# Patient Record
Sex: MALE
[De-identification: names, ages, dates, MRNs, and addresses within clinical notes are randomized per-mention and may not be internally consistent; named-entity substitution may affect disease eponyms.]

---

## 2017-02-18 VITALS — HEART RATE: 85 BPM

## 2018-04-20 ENCOUNTER — HOSPITAL ENCOUNTER (OUTPATIENT)
Dept: HOSPITAL 42 - OPSURG | Age: 58
Discharge: HOME | End: 2018-04-20
Attending: RADIOLOGY
Payer: MEDICARE

## 2018-04-20 VITALS — OXYGEN SATURATION: 96 % | TEMPERATURE: 97.7 F

## 2018-04-20 VITALS — BODY MASS INDEX: 56.7 KG/M2

## 2018-04-20 VITALS — SYSTOLIC BLOOD PRESSURE: 149 MMHG | DIASTOLIC BLOOD PRESSURE: 72 MMHG | HEART RATE: 72 BPM

## 2018-04-20 VITALS — RESPIRATION RATE: 18 BRPM

## 2018-04-20 DIAGNOSIS — K70.31: Primary | ICD-10-CM

## 2018-04-20 LAB
APPEARANCE FLD: (no result)
APTT BLD: 35 SECONDS (ref 25.1–36.5)
BASOPHILS # BLD AUTO: 0.09 K/MM3 (ref 0–2)
BASOPHILS NFR BLD: 1.1 % (ref 0–3)
BODY FLD TYPE: (no result)
BUN SERPL-MCNC: 14 MG/DL (ref 7–21)
CALCIUM SERPL-MCNC: 8.9 MG/DL (ref 8.4–10.5)
CELL CNT PNL FLD: 100 (ref 0–0)
EOSINOPHIL # BLD: 0.5 10*3/UL (ref 0–0.7)
EOSINOPHIL NFR BLD: 6 % (ref 1.5–5)
ERYTHROCYTE [DISTWIDTH] IN BLOOD BY AUTOMATED COUNT: 13.9 % (ref 11.5–14.5)
GFR NON-AFRICAN AMERICAN: > 60
GRANULOCYTES # BLD: 4.55 10*3/UL (ref 1.4–6.5)
GRANULOCYTES NFR BLD: 55.7 % (ref 50–68)
HGB BLD-MCNC: 13.3 G/DL (ref 14–18)
INR PPP: 1.92 (ref 0.93–1.08)
LYMPHOCYTES # BLD: 2.3 10*3/UL (ref 1.2–3.4)
LYMPHOCYTES NFR BLD AUTO: 28.2 % (ref 22–35)
MCH RBC QN AUTO: 31.7 PG (ref 25–35)
MCHC RBC AUTO-ENTMCNC: 32.6 G/DL (ref 31–37)
MCV RBC AUTO: 97.4 FL (ref 80–105)
MONOCYTES # BLD AUTO: 0.7 10*3/UL (ref 0.1–0.6)
MONOCYTES NFR BLD: 9 % (ref 1–6)
PLATELET # BLD: 154 10^3/UL (ref 120–450)
PMV BLD AUTO: 9.9 FL (ref 7–11)
PROTHROMBIN TIME: 22.4 SECONDS (ref 9.4–12.5)
RBC # BLD AUTO: 4.19 10^6/UL (ref 3.5–6.1)
WBC # BLD AUTO: 8.2 10^3/UL (ref 4.5–11)

## 2018-04-20 PROCEDURE — 49083 ABD PARACENTESIS W/IMAGING: CPT

## 2018-04-20 PROCEDURE — 88108 CYTOPATH CONCENTRATE TECH: CPT

## 2018-04-20 PROCEDURE — 89051 BODY FLUID CELL COUNT: CPT

## 2018-04-20 PROCEDURE — 36415 COLL VENOUS BLD VENIPUNCTURE: CPT

## 2018-04-20 PROCEDURE — 87070 CULTURE OTHR SPECIMN AEROBIC: CPT

## 2018-04-20 PROCEDURE — 87075 CULTR BACTERIA EXCEPT BLOOD: CPT

## 2018-04-20 PROCEDURE — 80048 BASIC METABOLIC PNL TOTAL CA: CPT

## 2018-04-20 PROCEDURE — 87101 SKIN FUNGI CULTURE: CPT

## 2018-04-20 PROCEDURE — 85610 PROTHROMBIN TIME: CPT

## 2018-04-20 PROCEDURE — 82042 OTHER SOURCE ALBUMIN QUAN EA: CPT

## 2018-04-20 PROCEDURE — 85025 COMPLETE CBC W/AUTO DIFF WBC: CPT

## 2018-04-20 PROCEDURE — 85730 THROMBOPLASTIN TIME PARTIAL: CPT

## 2018-04-20 NOTE — US
PROCEDURE:  Ultrasound guided paracentesis.



HISTORY:

Alcoholic cirrhosis.  Ascites with abdominal pain and distension.



PHYSICIAN(S):  Boston Paniagua MD.



TECHNIQUE:

The relative risks and indications for the procedure were explained 

to the patient and informed written consent obtained. Sonography of 

the abdomen was performed in a supine position. This revealed a small 

to moderate amount of non-loculated ascites, greatest in the right 

mid abdomen. 



A puncture site was selected and the area was prepped and draped in 

the usual sterile fashion. 1% Xylocaine was used to anesthetize the 

skin and soft tissues. A 7 Indonesian paracentesis catheter was trocared 

into the right mid abdomenand 2400 cc of bloody, non clotting blood 

aspirated. The appropriate labs were sent. The patient was advised 

about close clinical follow-up along with his wife. Dr. Luke santiago was 

contact 



IMPRESSION:

Ultrasound-guided paracentesis in the right mid abdomen. 2400 cc of 

bloody fluid were aspirated

## 2018-05-15 ENCOUNTER — HOSPITAL ENCOUNTER (INPATIENT)
Dept: HOSPITAL 42 - ED | Age: 58
LOS: 2 days | Discharge: HOME | DRG: 292 | End: 2018-05-17
Attending: INTERNAL MEDICINE | Admitting: INTERNAL MEDICINE
Payer: MEDICARE

## 2018-05-15 VITALS — BODY MASS INDEX: 51.6 KG/M2

## 2018-05-15 DIAGNOSIS — N39.0: ICD-10-CM

## 2018-05-15 DIAGNOSIS — B96.20: ICD-10-CM

## 2018-05-15 DIAGNOSIS — M06.9: ICD-10-CM

## 2018-05-15 DIAGNOSIS — R18.8: ICD-10-CM

## 2018-05-15 DIAGNOSIS — Z83.3: ICD-10-CM

## 2018-05-15 DIAGNOSIS — I07.1: ICD-10-CM

## 2018-05-15 DIAGNOSIS — Z98.84: ICD-10-CM

## 2018-05-15 DIAGNOSIS — I48.2: ICD-10-CM

## 2018-05-15 DIAGNOSIS — R79.1: ICD-10-CM

## 2018-05-15 DIAGNOSIS — G47.33: ICD-10-CM

## 2018-05-15 DIAGNOSIS — I50.82: ICD-10-CM

## 2018-05-15 DIAGNOSIS — K21.9: ICD-10-CM

## 2018-05-15 DIAGNOSIS — I50.32: ICD-10-CM

## 2018-05-15 DIAGNOSIS — I11.0: Primary | ICD-10-CM

## 2018-05-15 DIAGNOSIS — Z82.3: ICD-10-CM

## 2018-05-15 DIAGNOSIS — Z87.891: ICD-10-CM

## 2018-05-15 DIAGNOSIS — Z79.01: ICD-10-CM

## 2018-05-15 DIAGNOSIS — K70.9: ICD-10-CM

## 2018-05-15 DIAGNOSIS — I89.0: ICD-10-CM

## 2018-05-15 DIAGNOSIS — E66.2: ICD-10-CM

## 2018-05-15 DIAGNOSIS — I27.29: ICD-10-CM

## 2018-05-15 DIAGNOSIS — K74.60: ICD-10-CM

## 2018-05-15 DIAGNOSIS — Z82.49: ICD-10-CM

## 2018-05-15 LAB
ALBUMIN SERPL-MCNC: 4.2 G/DL (ref 3–4.8)
ALBUMIN/GLOB SERPL: 1.4 {RATIO} (ref 1.1–1.8)
ALT SERPL-CCNC: 28 U/L (ref 7–56)
APPEARANCE UR: CLEAR
APTT BLD: 38.4 SECONDS (ref 25.1–36.5)
AST SERPL-CCNC: 30 U/L (ref 17–59)
BACTERIA #/AREA URNS HPF: (no result) /[HPF]
BASOPHILS # BLD AUTO: 0.1 K/MM3 (ref 0–2)
BASOPHILS NFR BLD: 1.5 % (ref 0–3)
BILIRUB UR-MCNC: NEGATIVE MG/DL
BNP SERPL-MCNC: 1110 PG/ML (ref 0–450)
BUN SERPL-MCNC: 17 MG/DL (ref 7–21)
CALCIUM SERPL-MCNC: 9.2 MG/DL (ref 8.4–10.5)
COLOR UR: YELLOW
EOSINOPHIL # BLD: 0.4 10*3/UL (ref 0–0.7)
EOSINOPHIL NFR BLD: 5.5 % (ref 1.5–5)
EPI CELLS #/AREA URNS HPF: (no result) /HPF (ref 0–5)
ERYTHROCYTE [DISTWIDTH] IN BLOOD BY AUTOMATED COUNT: 13.8 % (ref 11.5–14.5)
GFR NON-AFRICAN AMERICAN: > 60
GLUCOSE UR STRIP-MCNC: NEGATIVE MG/DL
GRANULOCYTES # BLD: 3.73 10*3/UL (ref 1.4–6.5)
GRANULOCYTES NFR BLD: 57.2 % (ref 50–68)
HGB BLD-MCNC: 13 G/DL (ref 14–18)
INR PPP: 2.64 (ref 0.93–1.08)
LEUKOCYTE ESTERASE UR-ACNC: (no result) LEU/UL
LIPASE SERPL-CCNC: 162 U/L (ref 23–300)
LYMPHOCYTES # BLD: 1.5 10*3/UL (ref 1.2–3.4)
LYMPHOCYTES NFR BLD AUTO: 22.8 % (ref 22–35)
MCH RBC QN AUTO: 31.6 PG (ref 25–35)
MCHC RBC AUTO-ENTMCNC: 32.7 G/DL (ref 31–37)
MCV RBC AUTO: 96.4 FL (ref 80–105)
MONOCYTES # BLD AUTO: 0.9 10*3/UL (ref 0.1–0.6)
MONOCYTES NFR BLD: 13 % (ref 1–6)
PH UR STRIP: 6 [PH] (ref 4.7–8)
PLATELET # BLD: 181 10^3/UL (ref 120–450)
PMV BLD AUTO: 9.8 FL (ref 7–11)
PROT UR STRIP-MCNC: NEGATIVE MG/DL
PROTHROMBIN TIME: 30.7 SECONDS (ref 9.4–12.5)
RBC # BLD AUTO: 4.12 10^6/UL (ref 3.5–6.1)
RBC # UR STRIP: (no result) /UL
SP GR UR STRIP: 1.01 (ref 1–1.03)
TROPONIN I SERPL-MCNC: < 0.01 NG/ML
UROBILINOGEN UR STRIP-ACNC: 1 E.U./DL
WBC # BLD AUTO: 6.5 10^3/UL (ref 4.5–11)

## 2018-05-15 NOTE — CARD
--------------- APPROVED REPORT --------------





EKG Measurement

Heart [id number]

[id number]

[id number]

[id number]



<Conclusion>

Atrial fibrillation

Abnormal ECG

## 2018-05-15 NOTE — RAD
HISTORY:

58yoM, with sob  



COMPARISON:

02/18/2017 



FINDINGS:



LUNGS:

No active pulmonary disease.



PLEURA:

No significant pleural effusion identified, no pneumothorax apparent.



CARDIOVASCULAR:

Mild cardiomegaly



OSSEOUS STRUCTURES:

No significant abnormalities.



VISUALIZED UPPER ABDOMEN:

Normal.



OTHER FINDINGS:

None.



IMPRESSION:

No active disease.

## 2018-05-15 NOTE — ED PDOC
Arrival/HPI





- General


Chief Complaint: Shortness Of Breath


Time Seen by Provider: 05/15/18 14:14


Historian: Patient, Spouse





- History of Present Illness


Narrative History of Present Illness (Text): 


you were treated in the ED today for hx of CHF, Afib on coumadin, ascites/lymph 

edema with prior abdomen fluid taps for large volume of fluid, with difficulty 

breathing and abdomen fullness but otherwise without any nausea/vomiting/

headache/dizziness/chest pain/numbness/tingling/loss of limb function/pain with 

urination. 





05/15/18 17:12





Time/Duration: 1 week


Symptom Onset: Gradual


Symptom Course: Unchanged


Quality: Aching


Severity Level: 2


Activities at Onset: Rest


Context: Sitting





Past Medical History





- Provider Review


Nursing Documentation Reviewed: Yes





- Travel History


Have you recently traveled outside [place] w/in the past 3 mons?: No





- Infectious Disease


Hx of Infectious Diseases: None





- Tetanus Immunization


Tetanus Immunization: Unknown





- Cardiac


Hx Atrial Fibrillation: Yes


Hx Hypertension: Yes





- Pulmonary


Hx Respiratory Disorders: No





- Neurological


Hx Paralysis: No





- HEENT


Hx HEENT Disorder:  (WEARS RX GLASSES)





- Renal


Hx Renal Disorder: No





- Endocrine/Metabolic


Hx Endocrine Disorders: No





- Hematological/Oncological


Hx Blood Transfusions: No


Other/Comment: lymphedema





- Integumentary


Hx Dermatological Disorder: Yes (BILATERAL LE LYMPEDEMA + 4 EDEMA)





- Musculoskeletal/Rheumatological


Hx Arthritis: Yes


Hx Rheumatoid Arthritis: Yes





- Gastrointestinal


Hx Gastrointestinal Disorders: Yes (reflux/hemorrhoids/ulcer)


Other/Comment: ascites





- Genitourinary/Gynecological


Hx Reproductive Disorders: No





- Psychiatric


Hx Psychophysiologic Disorder: No


Hx Emotional Abuse: No


Hx Physical Abuse: No


Hx Substance Use: Yes





- Surgical History


Hx Gastric Bypass Surgery: Yes (2000)


Other/Comment: Cardiac ablation for atrial fibrillation





- Anesthesia


Hx Anesthesia Reactions: No


Hx Malignant Hyperthermia: No





- Suicidal Assessment


Feels Threatened In Home Enviroment: No





Family/Social History





- Physician Review


Nursing Documentation Reviewed: Yes


Family/Social History: No Known Family HX


Smoking Status: Never Smoked


Hx Alcohol Use: Yes (DAILY WINE.BOTTLE OF WINE DAILY. LAST DRANK 6-28-16)


Hx Substance Use: Yes


Hx Substance Use Treatment: No





Allergies/Home Meds


Allergies/Adverse Reactions: 


Allergies





No Known Allergies Allergy (Verified 05/15/18 14:04)


 








Home Medications: 


 Home Meds











 Medication  Instructions  Recorded  Confirmed


 


Metoprolol Tartrate 150 mg PO DAILY 04/20/18 05/15/18


 


traMADol [Ultram] 50 mg PO Q6H PRN 04/20/18 05/15/18














Review of Systems





- Review of Systems


Constitutional: Normal


Eyes: Normal


ENT: Normal


Respiratory: SOB


Cardiovascular: Normal


Gastrointestinal: Abdominal Pain


Genitourinary Male: Normal


Musculoskeletal: Normal


Skin: Normal


Neurological: Normal


Endocrine: Normal


Hemo/Lymphatic: Normal


Psychiatric: Normal





Physical Exam


Vital Signs Reviewed: Yes


Vital Signs











  Temp Pulse Resp BP Pulse Ox


 


 05/15/18 14:46    18   95


 


 05/15/18 14:05  97.6 F  69  18  134/81  99











Temperature: Afebrile


Blood Pressure: Hypertensive


Pulse: Regular


Respiratory Rate: Normal


Appearance: Positive for: Well-Appearing, Non-Toxic, Comfortable


Pain Distress: None


Mental Status: Positive for: Alert and Oriented X 3





- Systems Exam


Head: Present: Atraumatic, Normocephalic


Pupils: Present: PERRL


Extroacular Muscles: Present: EOMI


Conjunctiva: Present: Normal


Ears: Present: Normal


Mouth: Present: Moist Mucous Membranes


Pharnyx: Present: Normal


Nose (External): Present: Atraumatic


Nose (Internal): Present: Normal Inspection


Neck: Present: Normal Range of Motion


Respiratory/Chest: Present: Clear to Auscultation, Good Air Exchange


Cardiovascular: Present: Regular Rate and Rhythm


Abdomen: Present: Other (obesity).  No: Tenderness, Distention, Normal Bowel 

Sounds, Peritoneal Signs, Rebound, Guarding, McBurney's Point Tender, Rovsing's 

Sign Present, Hernias, Feeding Tubes, Ostomy Tubes, Mass/Organomegaly, Scars


Back: Present: Normal Inspection


Upper Extremity: Present: Normal Inspection


Lower Extremity: Present: Edema


Neurological: Present: GCS=15, CN II-XII Intact, Speech Normal, Motor Func 

Grossly Intact


Skin: Present: Warm, Normal Color


Psychiatric: Present: Alert, Oriented x 3, Normal Insight, Normal Concentration





Medical Decision Making


ED Course and Treatment: 


you were treated in the ED today for hx of CHF, Afib on coumadin, ascites/lymph 

edema with prior abdomen fluid taps for large volume of fluid, with difficulty 

breathing and abdomen fullness but otherwise without any nausea/vomiting/

headache/dizziness/chest pain/numbness/tingling/loss of limb function/pain with 

urination. You were otherwise breathing easily, pink moist lips, smiling and 

talking with your wife, good strength/sensation, alert/oriented, walking easily

, clear lungs, no abdomen tenderness, no fever temp 97.6, stable heart rate 69, 

stable breathing rate 18, excellent oxygen level 99% room air, elevated blood 

pressure 134/81 which we recommend repeat in 2-3 days primary care office to 

determine further treatment, you have blood tests no infection count 6, stable 

blood level hemoglobin 13/platelets 181, stable chemistry, Liver bilirubin 

mildly elevated 2.5, heart blood test negative less than 0.01, bnp 1110, urine 

test unclear sign of infection with trace leukocytes, INR 2.64, lipase normal 

162, radiology chest xray with no active disease, ECG afib, observation done in 

the ED without improvement.





ct a/p 5/3/18 with moderate ascites and anasarca with extensive subcutaneous 

edema.





Report Date : 05/15/2018 15:16:38


Procedure: Chest xray


Dictator : Aris Loomis MD


IMPRESSION: No active disease.





d/w hospitalist Dr. Winn who stated can admit to telemetry for fluid overload/

ascites evaluation for possible paracentesis.








05/15/18 17:15





Reassessment Condition: Re-examined, Unchanged





- Lab Interpretations


Lab Results: 








 05/15/18 14:30 





 05/15/18 14:30 





 Lab Results





05/15/18 14:30: Sodium 137, Potassium 5.0, Chloride 100, Carbon Dioxide 27, 

Anion Gap 16, BUN 17, Creatinine 0.7 L, Est GFR ( Amer) > 60, Est GFR (

Non-Af Amer) > 60, Random Glucose 105, Calcium 9.2, Magnesium 1.9, Total 

Bilirubin 2.5 H, AST 30, ALT 28, Alkaline Phosphatase 92, Lactate Dehydrogenase 

457, Total Creatine Kinase 80, Troponin I < 0.01, NT-Pro-B Natriuret Pep 1110 H

, Total Protein 7.2, Albumin 4.2, Globulin 3.1, Albumin/Globulin Ratio 1.4, 

Lipase 162


05/15/18 14:30: PT 30.7 H, INR 2.64 H, APTT 38.4 H


05/15/18 14:30: WBC 6.5  D, RBC 4.12, Hgb 13.0 L, Hct 39.7 L, MCV 96.4, MCH 31.6

, MCHC 32.7, RDW 13.8, Plt Count 181, MPV 9.8, Gran % 57.2, Lymph % (Auto) 22.8

, Mono % (Auto) 13.0 H, Eos % (Auto) 5.5 H, Baso % (Auto) 1.5, Gran # 3.73, 

Lymph # (Auto) 1.5, Mono # (Auto) 0.9 H, Eos # (Auto) 0.4, Baso # (Auto) 0.10


05/15/18 14:20: Urine Color Yellow, Urine Appearance Clear, Urine pH 6.0, Ur 

Specific Gravity 1.015, Urine Protein Negative, Urine Glucose (UA) Negative, 

Urine Ketones Negative, Urine Blood Trace-intact H, Urine Nitrate Negative, 

Urine Bilirubin Negative, Urine Urobilinogen 1.0 H, Ur Leukocyte Esterase Small 

H, Urine RBC 1 - 3, Urine WBC 2 - 5, Ur Epithelial Cells None, Urine Bacteria 

Few








I have reviewed the lab results: Yes





- RAD Interpretation


Radiology Orders: 








05/15/18 14:38


CHEST PORTABLE [RAD] Stat 











: Radiologist





- EKG Interpretation


Interpreted by ED Physician: Yes (afib)


Type: 12 lead EKG


Comparison: Similar to previous EKG (2/18/17)





- Medication Orders


Current Medication Orders: 











Discontinued Medications





Morphine Sulfate (Morphine)  4 mg IVP STAT STA


   Stop: 05/15/18 15:59


   Last Admin: 05/15/18 16:15  Dose: 4 mg





MAR Pain Assessment


 Document     05/15/18 16:15  MS  (Rec: 05/15/18 16:17  MS  [id number])


     Pain Reassessment


      Is this a pain reassessment?               No


     Sleep


      Is patient sleeping during reassessment?   No


     Presence of Pain


      Presence of Pain                           Yes


     Pain Scale Used


      Pain Scale Used                            Numeric


     Location


      Upper or Lower                             Upper


      Pain Location Body Site                    Abdomen


     Description


      Description                                Constant


      Intensity of Pain at present               9


      Pain Behavior                              Moaning


                                                 Guarding


                                                 Grasping Site


IVP Administration


 Document     05/15/18 16:15  MS  (Rec: 05/15/18 16:17  MS  [id number])


     Charges for Administration


      # of IVP Administrations                   1














Disposition/Present on Arrival





- Present on Arrival


Any Indicators Present on Arrival: No


History of DVT/PE: No


History of Uncontrolled Diabetes: No


Urinary Catheter: No


History of Decub. Ulcer: No


History Surgical Site Infection Following: None





- Disposition


Have Diagnosis and Disposition been Completed?: Yes


Diagnosis: 


 Congestive heart failure (CHF), Ascites





Disposition: HOSPITALIZED


Disposition Time: 17:16


Patient Plan: Admission, Telemetry


Condition: STABLE


Discharge Instructions (ExitCare):  Heart Failure (ED)


Referrals: 


Nawaf Hampton MD [Primary Care Provider] - Follow up with primary


Forms:  [email or web address] (English)

## 2018-05-15 NOTE — CP.PCM.HP
<Handy Hernandes - Last Filed: 05/15/18 18:18>





History of Present Illness





- History of Present Illness


History of Present Illness: 


58 year old male with past medical history of ascites, CHF, atrial fibrillation

, morbid obesity, lymphadema, GERD, Pulmonary HTN presents with shortness of 

breath. Patient states shortness of breath began yesterday. When he tries to 

ambulate a small distance he feels like he cant breath and he has also noted 

his belly has been getting juarez and larger. Patient states that he has gained 

about 50 lbs over the past 6 months and that his abdomen has been tapped twice, 

last in April for which 2.5 L was removed. He saw his cardiologist, Dr. Orr last week for which he was given a prescription for a water pill but 

he has yest to have it filled. Patient denies any chest pain, fever, cough, 

chills, sick contacts or any other complaints at this time. 





Cardio: Dr. Orr


PMD: Dr. Naik





PMH: CHF (distolic dysfunction), atrial fibrillation, morbid obesity, lymphadema

, GERD, Pulmonary HTN


Med: Coumadin 2.5 mg PO daily, Metoprolol 100 mg PO daily, Digoxin 0.25 PO daily

, Tramadol 50 mg PO TID PRN, folic acid 


Allergy: NKDA


PSH: IVC filter placement (2011), Cardiac Ablation (2000), Gastric bypass (2000)


Hosp: recent hospitalization in February for similar symptoms and paracentesis 

by Dr. Paniagua in April


FH: Mother - CVA, DM; Father - CVA, HTN


Social: bottle of wine over 3-4 days, smoked a pack a day, denies illicit drug 

use








Present on Admission





- Present on Admission


Any Indicators Present on Admission: No





Review of Systems





- Constitutional


Constitutional: absent: Chills, Fever, Night Sweats





- EENT


Eyes: absent: Blurred Vision, Change in Vision





- Cardiovascular


Cardiovascular: Dyspnea, Orthopnea.  absent: Chest Pain, Palpitations





- Respiratory


Respiratory: Dyspnea.  absent: Cough, Wheezing, Snoring





- Gastrointestinal


Gastrointestinal: Abdominal Pain.  absent: Coffee Ground Emesis, Melena, Nausea

, Vomiting





- Genitourinary


Genitourinary: absent: Change in Urinary Stream, Difficulty Urinating





- Musculoskeletal


Musculoskeletal: absent: Numbness, Tingling





- Neurological


Neurological: absent: Dizziness, Tingling





Past Patient History





- Infectious Disease


Hx of Infectious Diseases: None





- Tetanus Immunizations


Tetanus Immunization: Unknown





- Past Social History


Smoking Status: Never Smoked





- CARDIAC


Hx Atrial Fibrillation: Yes


Hx Hypertension: Yes





- PULMONARY


Hx Respiratory Disorders: No





- NEUROLOGICAL


Hx Paralysis: No





- HEENT


Hx HEENT Problems:  (WEARS RX GLASSES)





- RENAL


Hx Chronic Kidney Disease: No





- ENDOCRINE/METABOLIC


Hx Endocrine Disorders: No





- HEMATOLOGICAL/ONCOLOGICAL


Hx Blood Transfusions: No


Other/Comment: lymphedema





- INTEGUMENTARY


Hx Dermatological Problems: Yes (BILATERAL LE LYMPEDEMA + 4 EDEMA)





- MUSCULOSKELETAL/RHEUMATOLOGICAL


Hx Arthritis: Yes


Hx Rheumatoid Arthritis: Yes





- GASTROINTESTINAL


Hx Gastrointestinal Disorders: Yes (reflux/hemorrhoids/ulcer)


Other/Comment: ascites





- GENITOURINARY/GYNECOLOGICAL


Hx Reproductive Disorders: No





- PSYCHIATRIC


Hx Psychophysiologic Disorder: No


Hx Emotional Abuse: No


Hx Physical Abuse: No


Hx Substance Use: Yes





- SURGICAL HISTORY


Hx Gastric Bypass Surgery: Yes (2000)


Other/Comment: Cardiac ablation for atrial fibrillation





- ANESTHESIA


Hx Anesthesia Reactions: No


Hx Malignant Hyperthermia: No





Meds


Allergies/Adverse Reactions: 


 Allergies











Allergy/AdvReac Type Severity Reaction Status Date / Time


 


No Known Allergies Allergy   Verified 05/15/18 14:04














Physical Exam





- Constitutional


Appears: Non-toxic, No Acute Distress





- Head Exam


Head Exam: ATRAUMATIC, NORMAL INSPECTION, NORMOCEPHALIC





- Eye Exam


Eye Exam: EOMI, Normal appearance





- ENT Exam


ENT Exam: Mucous Membranes Moist





- Respiratory Exam


Respiratory Exam: Rales, NORMAL BREATHING PATTERN





- GI/Abdominal Exam


GI & Abdominal Exam: Distended, Tenderness





Results





- Vital Signs


Recent Vital Signs: 





 Last Vital Signs











Temp  97.6 F   05/15/18 14:05


 


Pulse  69   05/15/18 14:05


 


Resp  18   05/15/18 14:46


 


BP  134/81   05/15/18 14:05


 


Pulse Ox  95   05/15/18 14:46














- Labs


Result Diagrams: 


 05/15/18 14:30





 05/15/18 14:30


Labs: 





 Laboratory Results - last 24 hr











  05/15/18 05/15/18 05/15/18





  14:20 14:30 14:30


 


WBC   6.5  D 


 


RBC   4.12 


 


Hgb   13.0 L 


 


Hct   39.7 L 


 


MCV   96.4 


 


MCH   31.6 


 


MCHC   32.7 


 


RDW   13.8 


 


Plt Count   181 


 


MPV   9.8 


 


Gran %   57.2 


 


Lymph % (Auto)   22.8 


 


Mono % (Auto)   13.0 H 


 


Eos % (Auto)   5.5 H 


 


Baso % (Auto)   1.5 


 


Gran #   3.73 


 


Lymph # (Auto)   1.5 


 


Mono # (Auto)   0.9 H 


 


Eos # (Auto)   0.4 


 


Baso # (Auto)   0.10 


 


PT    30.7 H


 


INR    2.64 H


 


APTT    38.4 H


 


Sodium   


 


Potassium   


 


Chloride   


 


Carbon Dioxide   


 


Anion Gap   


 


BUN   


 


Creatinine   


 


Est GFR ( Amer)   


 


Est GFR (Non-Af Amer)   


 


Random Glucose   


 


Calcium   


 


Magnesium   


 


Total Bilirubin   


 


AST   


 


ALT   


 


Alkaline Phosphatase   


 


Lactate Dehydrogenase   


 


Total Creatine Kinase   


 


Troponin I   


 


NT-Pro-B Natriuret Pep   


 


Total Protein   


 


Albumin   


 


Globulin   


 


Albumin/Globulin Ratio   


 


Lipase   


 


Urine Color  Yellow  


 


Urine Appearance  Clear  


 


Urine pH  6.0  


 


Ur Specific Gravity  1.015  


 


Urine Protein  Negative  


 


Urine Glucose (UA)  Negative  


 


Urine Ketones  Negative  


 


Urine Blood  Trace-intact H  


 


Urine Nitrate  Negative  


 


Urine Bilirubin  Negative  


 


Urine Urobilinogen  1.0 H  


 


Ur Leukocyte Esterase  Small H  


 


Urine RBC  1 - 3  


 


Urine WBC  2 - 5  


 


Ur Epithelial Cells  None  


 


Urine Bacteria  Few  














  05/15/18





  14:30


 


WBC 


 


RBC 


 


Hgb 


 


Hct 


 


MCV 


 


MCH 


 


MCHC 


 


RDW 


 


Plt Count 


 


MPV 


 


Gran % 


 


Lymph % (Auto) 


 


Mono % (Auto) 


 


Eos % (Auto) 


 


Baso % (Auto) 


 


Gran # 


 


Lymph # (Auto) 


 


Mono # (Auto) 


 


Eos # (Auto) 


 


Baso # (Auto) 


 


PT 


 


INR 


 


APTT 


 


Sodium  137


 


Potassium  5.0


 


Chloride  100


 


Carbon Dioxide  27


 


Anion Gap  16


 


BUN  17


 


Creatinine  0.7 L


 


Est GFR ( Amer)  > 60


 


Est GFR (Non-Af Amer)  > 60


 


Random Glucose  105


 


Calcium  9.2


 


Magnesium  1.9


 


Total Bilirubin  2.5 H


 


AST  30


 


ALT  28


 


Alkaline Phosphatase  92


 


Lactate Dehydrogenase  457


 


Total Creatine Kinase  80


 


Troponin I  < 0.01


 


NT-Pro-B Natriuret Pep  1110 H


 


Total Protein  7.2


 


Albumin  4.2


 


Globulin  3.1


 


Albumin/Globulin Ratio  1.4


 


Lipase  162


 


Urine Color 


 


Urine Appearance 


 


Urine pH 


 


Ur Specific Gravity 


 


Urine Protein 


 


Urine Glucose (UA) 


 


Urine Ketones 


 


Urine Blood 


 


Urine Nitrate 


 


Urine Bilirubin 


 


Urine Urobilinogen 


 


Ur Leukocyte Esterase 


 


Urine RBC 


 


Urine WBC 


 


Ur Epithelial Cells 


 


Urine Bacteria 














Assessment & Plan





- Assessment and Plan (Free Text)


Assessment: 


58 year old male with past medical history of ascites, CHF, atrial fibrillation

, morbid obesity, lymphadema, GERD, Pulmonary HTN presents with shortness of 

breath for the past day, along with increased abdominal distension. 





Plan: 


Shortness of Breath Likely secondary to CHF exacerbation


-chest xray showing vascular congestion


-EKG showing Afib


-Strict I's & O's


-Lasix 40 mg IVP BID


-1500ml fluid restriction daily


-Digoxin 0.25 mg PO daily


-Digoxin level pending


-BNP: 1110


-Metoprolol 100 mg PO daily


-Cardiology consult, Dr. Reynolds, follow recs 





Ascites-likely secondary to heart failure 


-IR consulted, [place] Paniagua, follow recs


-possible paracentesis


-Lasix IVP 40 BID


-hold coumadin 


-repeat INR and coags in AM





Atrial fibrillation-chronic


-Holding Coumadin 2.5 mg 


-Digoxin 0.25 mg PO daily


-Metoprolol 100 mg PO daily


-Cardiology consult, Dr. Orr, follow recs





HTN-chronic 


-Metoprolol 100 mg PO daily





Morbid Obesity


-dietician consult


-diet counseling 





GERD


-protonix 





Prophylaxis


DVT ppx: currently on hold for elevated INR


GI ppx: Protonix 40 mg PO daily








<Elvira Winn - Last Filed: 05/16/18 16:40>





Results





- Vital Signs


Recent Vital Signs: 





 Last Vital Signs











Temp  98.1 F   05/16/18 12:00


 


Pulse  64   05/16/18 14:00


 


Resp  20   05/16/18 12:00


 


BP  123/67   05/16/18 12:00


 


Pulse Ox  93 L  05/16/18 05:55














- Labs


Result Diagrams: 


 05/16/18 06:00





 05/16/18 06:00


Labs: 





 Laboratory Results - last 24 hr











  05/16/18





  12:10


 


Urine Color  Yellow


 


Urine Appearance  Clear


 


Urine pH  5.5


 


Ur Specific Gravity  <= 1.005


 


Urine Protein  Negative


 


Urine Glucose (UA)  Negative


 


Urine Ketones  Negative


 


Urine Blood  Negative


 


Urine Nitrate  Negative


 


Urine Bilirubin  Negative


 


Urine Urobilinogen  0.2


 


Ur Leukocyte Esterase  Small H


 


Urine RBC  0 - 2


 


Urine WBC  5 - 10


 


Ur Epithelial Cells  None


 


Urine Bacteria  Few














Attending/Attestation





- Attestation


I have personally seen and examined this patient.: Yes


I have fully participated in the care of the patient.: Yes


I have reviewed all pertinent clinical information: Yes


Notes (Text): 





05/16/18 16:36





Medical record note made by the resident after discussion with my direction and 

input after the patient was personally seen and examined by me. I have reviewed 

the chart and agree that the record accurately reflects by personal performance 

of the history, physical exam, data review, and medical decision-making, in the 

course for the patient. I have also personally directed the plan of care





58 year old male with past medical history of Morbid Obesity,HTN, diastolic  CHF

, atrial fibrillation on anticoagulation with warfarin, , lymphadema, GERD, and 

Pulmonary HTN is admitted with dyspnea on exertion and worsening abdominal 

swelling.Patient dyspnea is due to fluid overload, will start patient on IV 

lasix, will get 2D Echo, we will hold warfarin and will get IR evaluation for 

Paracentesis.We will also get cardiology evaluation. 





Management plan was discussed in detail with patient. Education was provided.

## 2018-05-16 VITALS — HEART RATE: 57 BPM

## 2018-05-16 LAB
ALBUMIN SERPL-MCNC: 3.9 G/DL (ref 3–4.8)
ALBUMIN/GLOB SERPL: 1.4 {RATIO} (ref 1.1–1.8)
ALT SERPL-CCNC: 24 U/L (ref 7–56)
APPEARANCE UR: CLEAR
AST SERPL-CCNC: 50 U/L (ref 17–59)
BACTERIA #/AREA URNS HPF: (no result) /[HPF]
BASOPHILS # BLD AUTO: 0.04 K/MM3 (ref 0–2)
BASOPHILS NFR BLD: 0.7 % (ref 0–3)
BILIRUB UR-MCNC: NEGATIVE MG/DL
BUN SERPL-MCNC: 16 MG/DL (ref 7–21)
CALCIUM SERPL-MCNC: 9.2 MG/DL (ref 8.4–10.5)
COLOR UR: YELLOW
EOSINOPHIL # BLD: 0.3 10*3/UL (ref 0–0.7)
EOSINOPHIL NFR BLD: 5.4 % (ref 1.5–5)
EPI CELLS #/AREA URNS HPF: (no result) /HPF (ref 0–5)
ERYTHROCYTE [DISTWIDTH] IN BLOOD BY AUTOMATED COUNT: 13.8 % (ref 11.5–14.5)
GFR NON-AFRICAN AMERICAN: > 60
GLUCOSE UR STRIP-MCNC: NEGATIVE MG/DL
GRANULOCYTES # BLD: 2.93 10*3/UL (ref 1.4–6.5)
GRANULOCYTES NFR BLD: 54.4 % (ref 50–68)
HGB BLD-MCNC: 11.6 G/DL (ref 14–18)
INR PPP: 3.01 (ref 0.93–1.08)
LEUKOCYTE ESTERASE UR-ACNC: (no result) LEU/UL
LYMPHOCYTES # BLD: 1.4 10*3/UL (ref 1.2–3.4)
LYMPHOCYTES NFR BLD AUTO: 25.4 % (ref 22–35)
MCH RBC QN AUTO: 30.9 PG (ref 25–35)
MCHC RBC AUTO-ENTMCNC: 32.2 G/DL (ref 31–37)
MCV RBC AUTO: 95.7 FL (ref 80–105)
MONOCYTES # BLD AUTO: 0.8 10*3/UL (ref 0.1–0.6)
MONOCYTES NFR BLD: 14.1 % (ref 1–6)
PH UR STRIP: 5.5 [PH] (ref 4.7–8)
PLATELET # BLD: 172 10^3/UL (ref 120–450)
PMV BLD AUTO: 10.1 FL (ref 7–11)
PROT UR STRIP-MCNC: NEGATIVE MG/DL
PROTHROMBIN TIME: 35.4 SECONDS (ref 9.4–12.5)
RBC # BLD AUTO: 3.76 10^6/UL (ref 3.5–6.1)
RBC # UR STRIP: NEGATIVE /UL
RBC #/AREA URNS HPF: (no result) /HPF (ref 0–2)
SP GR UR STRIP: <= 1.005 (ref 1–1.03)
UROBILINOGEN UR STRIP-ACNC: 0.2 E.U./DL
WBC # BLD AUTO: 5.4 10^3/UL (ref 4.5–11)

## 2018-05-16 PROCEDURE — BW40ZZZ ULTRASONOGRAPHY OF ABDOMEN: ICD-10-PCS | Performed by: RADIOLOGY

## 2018-05-16 PROCEDURE — 0W9G3ZZ DRAINAGE OF PERITONEAL CAVITY, PERCUTANEOUS APPROACH: ICD-10-PCS | Performed by: RADIOLOGY

## 2018-05-16 RX ADMIN — PANTOPRAZOLE SODIUM SCH MG: 40 TABLET, DELAYED RELEASE ORAL at 05:17

## 2018-05-16 RX ADMIN — CEFTRIAXONE SCH MLS/HR: 1 INJECTION, SOLUTION INTRAVENOUS at 15:54

## 2018-05-16 RX ADMIN — DIGOXIN SCH: 0.25 TABLET ORAL at 13:20

## 2018-05-16 NOTE — CP.PCM.PCO
Physician Communication Note





- Physician Communication Note


Physician Communication Note: Nurse called and asked to cancel order for abd us 

given patient taken for 





Hospital Course





- Lab Results


Lab Results: 


 Most Recent Lab Values











WBC  5.4 10^3/ul (4.5-11.0)   05/16/18  06:00    


 


RBC  3.76 10^6/uL (3.5-6.1)   05/16/18  06:00    


 


Hgb  11.6 g/dL (14.0-18.0)  L  05/16/18  06:00    


 


Hct  36.0 % (42.0-52.0)  L  05/16/18  06:00    


 


MCV  95.7 fl (80.0-105.0)   05/16/18  06:00    


 


MCH  30.9 pg (25.0-35.0)   05/16/18  06:00    


 


MCHC  32.2 g/dl (31.0-37.0)   05/16/18  06:00    


 


RDW  13.8 % (11.5-14.5)   05/16/18  06:00    


 


Plt Count  172 10^3/uL (120.0-450.0)   05/16/18  06:00    


 


MPV  10.1 fl (7.0-11.0)   05/16/18  06:00    


 


Gran %  54.4 % (50.0-68.0)   05/16/18  06:00    


 


Lymph % (Auto)  25.4 % (22.0-35.0)   05/16/18  06:00    


 


Mono % (Auto)  14.1 % (1.0-6.0)  H  05/16/18  06:00    


 


Eos % (Auto)  5.4 % (1.5-5.0)  H  05/16/18  06:00    


 


Baso % (Auto)  0.7 % (0.0-3.0)   05/16/18  06:00    


 


Gran #  2.93  (1.4-6.5)   05/16/18  06:00    


 


Lymph # (Auto)  1.4  (1.2-3.4)   05/16/18  06:00    


 


Mono # (Auto)  0.8  (0.1-0.6)  H  05/16/18  06:00    


 


Eos # (Auto)  0.3  (0.0-0.7)   05/16/18  06:00    


 


Baso # (Auto)  0.04 K/mm3 (0.0-2.0)   05/16/18  06:00    


 


PT  35.4 SECONDS (9.4-12.5)  H  05/16/18  06:30    


 


INR  3.01  (0.93-1.08)  H  05/16/18  06:30    


 


APTT  38.4 Seconds (25.1-36.5)  H  05/15/18  14:30    


 


Sodium  139 mmol/L (132-148)   05/16/18  06:00    


 


Potassium  4.4 mmol/L (3.6-5.0)   05/16/18  06:00    


 


Chloride  101 mmol/L ()   05/16/18  06:00    


 


Carbon Dioxide  25 mmol/L (21-33)   05/16/18  06:00    


 


Anion Gap  17  (10-20)   05/16/18  06:00    


 


BUN  16 mg/dL (7-21)   05/16/18  06:00    


 


Creatinine  0.7 mg/dl (0.8-1.5)  L  05/16/18  06:00    


 


Est GFR ( Amer)  > 60   05/16/18  06:00    


 


Est GFR (Non-Af Amer)  > 60   05/16/18  06:00    


 


Random Glucose  89 mg/dL ()   05/16/18  06:00    


 


Calcium  9.2 mg/dL (8.4-10.5)   05/16/18  06:00    


 


Magnesium  1.9 mg/dL (1.7-2.2)   05/15/18  14:30    


 


Total Bilirubin  2.8 mg/dL (0.2-1.3)  H  05/16/18  06:00    


 


AST  50 U/L (17-59)   05/16/18  06:00    


 


ALT  24 U/L (7-56)   05/16/18  06:00    


 


Alkaline Phosphatase  81 U/L ()   05/16/18  06:00    


 


Lactate Dehydrogenase  457 U/L (333-699)   05/15/18  14:30    


 


Total Creatine Kinase  80 U/L ()   05/15/18  14:30    


 


Troponin I  < 0.01 ng/mL  05/15/18  14:30    


 


NT-Pro-B Natriuret Pep  1110 pg/mL (0-450)  H  05/15/18  14:30    


 


Total Protein  6.7 g/dL (5.8-8.3)   05/16/18  06:00    


 


Albumin  3.9 g/dL (3.0-4.8)   05/16/18  06:00    


 


Globulin  2.9 gm/dL  05/16/18  06:00    


 


Albumin/Globulin Ratio  1.4  (1.1-1.8)   05/16/18  06:00    


 


Lipase  162 U/L ()   05/15/18  14:30    


 


Urine Color  Yellow  (YELLOW)   05/16/18  12:10    


 


Urine Appearance  Clear  (CLEAR)   05/16/18  12:10    


 


Urine pH  5.5  (4.7-8.0)   05/16/18  12:10    


 


Ur Specific Gravity  <= 1.005  (1.005-1.035)   05/16/18  12:10    


 


Urine Protein  Negative mg/dL (<30 mg/dL)   05/16/18  12:10    


 


Urine Glucose (UA)  Negative mg/dL (NEGATIVE)   05/16/18  12:10    


 


Urine Ketones  Negative mg/dL (NEGATIVE)   05/16/18  12:10    


 


Urine Blood  Negative  (NEGATIVE)   05/16/18  12:10    


 


Urine Nitrate  Negative  (NEGATIVE)   05/16/18  12:10    


 


Urine Bilirubin  Negative  (NEGATIVE)   05/16/18  12:10    


 


Urine Urobilinogen  0.2 E.U./dL (<1 E.U./dL)   05/16/18  12:10    


 


Ur Leukocyte Esterase  Small Salvador/uL (NEGATIVE)  H  05/16/18  12:10    


 


Urine RBC  0 - 2 /hpf (0-2)   05/16/18  12:10    


 


Urine WBC  5 - 10 /hpf (0-6)   05/16/18  12:10    


 


Ur Epithelial Cells  None /hpf (0-5)   05/16/18  12:10    


 


Urine Bacteria  Few  (NEG)   05/16/18  12:10    


 


Digoxin  1.0 ng/mL (0.8-2.0)   05/15/18  14:30    














- Hospital Course


Hospital Course: 


ultrasounded guided paracentesis by Dr. Boston Paniagua








- Date & Time of H&P


Date of H&P: 05/16/18


Time of H&P: 17:14

## 2018-05-16 NOTE — CON
DATE:  2018



REQUESTING PHYSICIAN:  Dr. Roche.



REASON FOR CONSULTATION:  Dyspnea.



HISTORY:  This is a 58-year-old man well known to me with a history of

chronic atrial fibrillation, morbid obesity, chronic lymphedema and

alcoholic liver disease, who was admitted with worsening dyspnea.  He has

undergone paracentesis in the past for significant ascites, also over the

past several months he has had significant weight gain of over 50 pounds. 

He was recently seen as an outpatient and advised follow up with Dr. Boston Paniagua for possible paracentesis.  He was also given a prescription for

spironolactone which he did not fill or obtain.  He presented and was

admitted.  He denies any chest pain.



PAST MEDICAL HISTORY:  Notable for the problems mentioned above.  He has

chronic lymphedema, gastroesophageal reflux disease, morbid obesity and

pulmonary hypertension.  He has had prior cardiac ablation performed as

well as a gastric bypass surgery and IVC filter placement.



MEDICATIONS:  At home include warfarin, metoprolol 100 mg daily, digoxin

0.25 mg daily, tramadol and folic acid.



ALLERGIES:  NONE.



SOCIAL HISTORY: He drinks intermittently, but states he has cut back on it

significantly.  He is also a former smoker.



FAMILY HISTORY:  His father  from cerebrovascular accident.  Mother

 from complications of diabetes and stroke as well.



REVIEW OF SYSTEMS:  A 10-point review of systems is notable mainly for the

problems as above.



PHYSICAL EXAMINATION:

GENERAL:  He is an obese, middle-aged man.

VITAL SIGNS:  His blood pressure is 136/78 with the pulse of 76, in atrial

fibrillation, respirations 14.  He is afebrile.  HEENT:  Normocephalic,

atraumatic.

NECK:  Supple.  No JVD noted.

CHEST:  Diminished breath sounds noted at bases.

HEART:  PMI displaced laterally with an irregularly irregular rhythm and

soft systolic murmur present at the lower left sternal border.

ABDOMEN:  Soft, markedly obese.  There is unclear if ascites is present. 

Bowel sounds are present.

EXTREMITIES:  Marked lymphedema in both lower extremities.

SKIN:  Significant bilateral lower extremity cellulitic changes at present.



DIAGNOSTIC DATA:  White count is 5.4, hemoglobin and hematocrit 11.6 and 36

with platelet count of 172,000.  INR 3.01, potassium 4.4, BUN and

creatinine 16 and 0.7.  BNP is 1110.  Troponin is negative.  Albumin is

3.9.  Digoxin level is 1.



IMPRESSION:

1.  Increasing dyspnea likely due to volume overload and chronic liver

disease.

2.  Possible ascites, needs evaluation and possible paracentesis.

3.  Chronic atrial fibrillation, controlled rate.

4.  History of alcohol abuse.

5.  Rest of problems as noted.



RECOMMENDATIONS:  Oral spironolactone will be added to his regimen at this

time.  IV Lasix should be continued for now.  Abdomen ultrasound is

advised.  If significant fluid accumulation is present, paracentesis should

be considered.  The need for alcohol abstinence was discussed with him.  We

will continue to follow and make further recommendation as appropriate.





__________________________________________

Yaya Dawson MD







DD:  2018 11:09:29

DT:  2018 11:13:27

Job # [id number]

## 2018-05-16 NOTE — CP.PCM.PN
<Quique Ambrosio - Last Filed: 05/16/18 09:14>





Subjective





- Date & Time of Evaluation


Date of Evaluation: 05/16/18


Time of Evaluation: 09:03





- Subjective


Subjective: 


Hospitalist Service


Patient seen and examined at [facility]. Patient reports orthopnea. Patient denies 

chest pain. Patient ask what time his paracentesis will be. Chart review 

indicates no adverse events overnight.








Objective





- Vital Signs/Intake and Output


Vital Signs (last 24 hours): 


 











Temp Pulse Resp BP Pulse Ox


 


 97.5 F L  77   20   136/78   93 L


 


 05/16/18 05:55  05/16/18 08:28  05/16/18 05:55  05/16/18 08:28  05/16/18 05:55








Intake and Output: 


 











 05/16/18 05/16/18





 06:59 18:59


 


Intake Total 240 


 


Output Total 2450 


 


Balance -2210 














- Medications


Medications: 


 Current Medications





Digoxin (Lanoxin)  0.25 mg PO 1400 [facility]


Furosemide (Lasix)  40 mg IVP Q12 [facility]


   Last Admin: 05/15/18 21:58 Dose:  Not Given


Metoprolol Tartrate (Lopressor)  100 mg PO BRK [facility]


   Last Admin: 05/16/18 08:41 Dose:  Not Given


Pantoprazole Sodium (Protonix Ec Tab)  40 mg PO 0600 [facility]


   Last Admin: 05/16/18 05:17 Dose:  40 mg


Tramadol HCl (Ultram)  50 mg PO Q6H PRN


   PRN Reason: Pain, moderate (4-7)











- Labs


Labs: 


 





 05/16/18 06:00 





 05/16/18 06:00 





 











PT  35.4 SECONDS (9.4-12.5)  H  05/16/18  06:30    


 


INR  3.01  (0.93-1.08)  H  05/16/18  06:30    


 


APTT  38.4 Seconds (25.1-36.5)  H  05/15/18  14:30    














- Constitutional


Appears: Well, Non-toxic





- Head Exam


Head Exam: ATRAUMATIC





- Eye Exam


Eye Exam: EOMI, Normal appearance





- Respiratory Exam


Respiratory Exam: Rales, NORMAL BREATHING PATTERN





- Cardiovascular Exam


Cardiovascular Exam: +S1, +S2





- GI/Abdominal Exam


GI & Abdominal Exam: Distended.  absent: Guarding





- Extremities Exam


Extremities Exam: Pedal Edema





- Neurological Exam


Neurological Exam: Alert, Awake, Oriented x3





- Psychiatric Exam


Psychiatric exam: Normal Affect, Normal Mood





Assessment and Plan





- Assessment and Plan (Free Text)


Assessment: 


58 year old male with past medical history of ascites, CHF, atrial fibrillation

, morbid obesity, lymphadema, GERD, Pulmonary HTN presents with shortness of 

breath for the past day, along with increased abdominal distension. 





Plan: 


Shortness of Breath Likely secondary to CHF exacerbation


-chest xray showing vascular congestion


-EKG showing Afib


-Strict I's & O's


-Lasix 40 mg IVP BID


-1500ml fluid restriction daily


-Digoxin 0.25 mg PO daily


-Digoxin level wnl


-BNP: 1110


-Metoprolol 100 mg PO daily


-Cardiology consult, Dr. Reynolds, follow recs 





Ascites-likely secondary to heart failure 


-IR consulted, Boston Paniagua, follow recs


-possible paracentesis


-Lasix IVP 40 BID


-hold coumadin 


-repeat INR is 3.01





Atrial fibrillation-chronic


-Holding Coumadin 2.5 mg 


-Digoxin 0.25 mg PO daily


-Metoprolol 100 mg PO daily


-Cardiology consult, Dr. Orr, follow recs





HTN-chronic 


-Metoprolol 100 mg PO daily





Morbid Obesity


-dietician consult


-diet counseling 





GERD


-protonix 





Prophylaxis


DVT ppx: currently on hold for elevated INR


GI ppx: Protonix 40 mg PO daily





Case reviewed and discussed with Dr. Winn








<Elvira Winn - Last Filed: 05/16/18 16:41>





Objective





- Vital Signs/Intake and Output


Vital Signs (last 24 hours): 


 











Temp Pulse Resp BP Pulse Ox


 


 98.1 F   64   20   123/67   93 L


 


 05/16/18 12:00  05/16/18 14:00  05/16/18 12:00  05/16/18 12:00  05/16/18 05:55








Intake and Output: 


 











 05/16/18 05/16/18





 06:59 18:59


 


Intake Total  660


 


Output Total  600


 


Balance  60














- Medications


Medications: 


 Current Medications





Digoxin (Lanoxin)  0.25 mg PO 1400 [facility]


   Last Admin: 05/16/18 13:20 Dose:  Not Given


Furosemide (Lasix)  40 mg IVP Q12 [facility]


   Last Admin: 05/16/18 09:11 Dose:  40 mg


Ceftriaxone Sodium (Rocephin 1 Gram Ivpb)  1 gm in 100 mls @ 100 mls/hr IVPB 

DAILY [facility]


   PRN Reason: Protocol


   Stop: 05/19/18 15:01


   Last Admin: 05/16/18 15:54 Dose:  100 mls/hr


Metoprolol Tartrate (Lopressor)  100 mg PO BRK [facility]


   Last Admin: 05/16/18 08:41 Dose:  Not Given


Pantoprazole Sodium (Protonix Ec Tab)  40 mg PO 0600 [facility]


   Last Admin: 05/16/18 05:17 Dose:  40 mg


Spironolactone (Aldactone)  25 mg PO BID [facility]


   Last Admin: 05/16/18 12:05 Dose:  25 mg


Tramadol HCl (Ultram)  50 mg PO Q6H PRN


   PRN Reason: Pain, moderate (4-7)


   Last Admin: 05/16/18 09:12 Dose:  50 mg


Zolpidem Tartrate (Ambien)  10 mg PO HS PRN; Protocol


   PRN Reason: Insomnia











- Labs


Labs: 


 











PT  35.4 SECONDS (9.4-12.5)  H  05/16/18  06:30    


 


INR  3.01  (0.93-1.08)  H  05/16/18  06:30    


 


APTT  38.4 Seconds (25.1-36.5)  H  05/15/18  14:30    














Attending/Attestation





- Attestation


I have personally seen and examined this patient.: Yes


I have fully participated in the care of the patient.: Yes


I have reviewed all pertinent clinical information, including history, physical 

exam and plan: Yes


Notes (Text): 





05/16/18 16:41


Medical record note made by the resident after discussion with my direction and 

input after the patient was personally seen and examined by me. I have reviewed 

the chart and agree that the record accurately reflects by personal performance 

of the history, physical exam, data review, and medical decision-making, in the 

course for the patient. I have also personally directed the plan of care

## 2018-05-17 VITALS
DIASTOLIC BLOOD PRESSURE: 63 MMHG | RESPIRATION RATE: 21 BRPM | SYSTOLIC BLOOD PRESSURE: 117 MMHG | TEMPERATURE: 97.5 F | HEART RATE: 83 BPM

## 2018-05-17 VITALS — OXYGEN SATURATION: 93 %

## 2018-05-17 LAB
ALBUMIN SERPL-MCNC: 3.9 G/DL (ref 3–4.8)
ALBUMIN/GLOB SERPL: 1.4 {RATIO} (ref 1.1–1.8)
ALT SERPL-CCNC: 29 U/L (ref 7–56)
AST SERPL-CCNC: 32 U/L (ref 17–59)
BASOPHILS # BLD AUTO: 0.04 K/MM3 (ref 0–2)
BASOPHILS NFR BLD: 0.8 % (ref 0–3)
BUN SERPL-MCNC: 16 MG/DL (ref 7–21)
CALCIUM SERPL-MCNC: 9 MG/DL (ref 8.4–10.5)
EOSINOPHIL # BLD: 0.3 10*3/UL (ref 0–0.7)
EOSINOPHIL NFR BLD: 6 % (ref 1.5–5)
ERYTHROCYTE [DISTWIDTH] IN BLOOD BY AUTOMATED COUNT: 14.1 % (ref 11.5–14.5)
GFR NON-AFRICAN AMERICAN: > 60
GRANULOCYTES # BLD: 3.12 10*3/UL (ref 1.4–6.5)
GRANULOCYTES NFR BLD: 60.2 % (ref 50–68)
HGB BLD-MCNC: 12 G/DL (ref 14–18)
INR PPP: 2.56 (ref 0.93–1.08)
LYMPHOCYTES # BLD: 1.1 10*3/UL (ref 1.2–3.4)
LYMPHOCYTES NFR BLD AUTO: 21.8 % (ref 22–35)
MCH RBC QN AUTO: 30.8 PG (ref 25–35)
MCHC RBC AUTO-ENTMCNC: 31.9 G/DL (ref 31–37)
MCV RBC AUTO: 96.4 FL (ref 80–105)
MONOCYTES # BLD AUTO: 0.6 10*3/UL (ref 0.1–0.6)
MONOCYTES NFR BLD: 11.2 % (ref 1–6)
PLATELET # BLD: 179 10^3/UL (ref 120–450)
PMV BLD AUTO: 10 FL (ref 7–11)
PROTHROMBIN TIME: 30 SECONDS (ref 9.4–12.5)
RBC # BLD AUTO: 3.9 10^6/UL (ref 3.5–6.1)
WBC # BLD AUTO: 5.2 10^3/UL (ref 4.5–11)

## 2018-05-17 RX ADMIN — CEFTRIAXONE SCH MLS/HR: 1 INJECTION, SOLUTION INTRAVENOUS at 10:25

## 2018-05-17 RX ADMIN — DIGOXIN SCH: 0.25 TABLET ORAL at 14:08

## 2018-05-17 RX ADMIN — PANTOPRAZOLE SODIUM SCH MG: 40 TABLET, DELAYED RELEASE ORAL at 06:15

## 2018-05-17 NOTE — CARD
--------------- APPROVED REPORT --------------





EXAM: Two-dimensional and M-mode echocardiogram with Doppler and 

color Doppler.



Other Information 

Quality : AverageRhythm : 



INDICATION

Dyspnea , edema, ascites



2D DIMENSIONS 

Left Atrium (2D)4.4   (1.6-4.0cm)IVSd1.1   (0.7-1.1cm)

LVDd5.0   (3.9-5.9cm)PWd1.2   (0.7-1.1cm)

LVDs3.4   (2.5-4.0cm)FS (%) 32.9   %

LVEF (%)61.0   (>50%)



M-Mode DIMENSIONS 

Aortic Root2.80   (2.2-3.7cm)Aortic Cusp Exc.1.60   (1.5-2.0cm)



Aortic Valve

AoV Peak Yhwckxeh586.0cm/s



Mitral Valve

E/A ratio0.0



TDI

E/Lateral E'0.0E/Medial E'0.0



Pulmonary Valve

PV Peak Mrejyruh84.8cm/sPV Peak Grad.3mmHg



Tricuspid Valve

TR Peak [id number]/sRAP [id number] Peak Gr.72mmHg

[id number]



 LEFT VENTRICLE 

The left ventricle is normal size. There is normal left ventricular 

wall thickness. The left ventricular function is normal. The left 

ventricular ejection fraction is within the normal range. There is 

normal LV segmental wall motion.



 RIGHT VENTRICLE 

The right ventricle is moderately dilated.



 ATRIA 

The left atrium is mildly dilated. The right atrium is moderately 

dilated. The interatrial septum is intact with no evidence for an 

atrial septal defect.



 AORTIC VALVE 

The aortic valve is normal in structure.



 MITRAL VALVE 

The mitral valve is normal in structure. Mitral regurgitation is mild.



 TRICUSPID VALVE 

The tricuspid valve is normal in structure. There is moderate to 

severe tricuspid regurgitation. There is severe pulmonary 

hypertension.



 GREAT VESSELS 

The aortic root is normal in size.



 PERICARDIAL EFFUSION 

There is no pericardial effusion.



<Conclusion>

The left ventricle is normal size.

There is normal left ventricular wall thickness.

The left ventricular function is normal.

Mitral regurgitation is mild.

There is moderate to severe tricuspid regurgitation.

There is severe pulmonary hypertension.

## 2018-05-17 NOTE — CP.PCM.PN
Subjective





- Date & Time of Evaluation


Date of Evaluation: 05/17/18


Time of Evaluation: 07:00





- Subjective


Subjective: 





Stable on 2R, s/p paracentesis yesterday.  3100cc removed. No CP or SOB.





V/S noted. AF im 70s





PE:





Lungs: rhonchi


Cor.: irreg S1S2, Sys. murmru


Abd.: obese


Ext: lymphedema


Neuro.: alert





I/O= 1320/1900. Also paracentesis 3100 cc removed.





Labs noted: INR = 2.56, BMP OK





Urine: + GNR





Echo done: will read.





Objective





- Vital Signs/Intake and Output


Vital Signs (last 24 hours): 


 











Temp Pulse Resp BP Pulse Ox


 


 97.6 F   72   20   132/72   93 L


 


 05/17/18 04:00  05/17/18 06:00  05/17/18 04:00  05/17/18 04:00  05/17/18 04:00








Intake and Output: 


 











 05/17/18 05/17/18





 06:59 18:59


 


Intake Total 660 


 


Output Total 1300 


 


Balance -640 














- Medications


Medications: 


 Current Medications





Digoxin (Lanoxin)  0.25 mg PO 1400 [facility]


   Last Admin: 05/16/18 13:20 Dose:  Not Given


Furosemide (Lasix)  40 mg IVP Q12 [facility]


   Last Admin: 05/16/18 22:07 Dose:  Not Given


Ceftriaxone Sodium (Rocephin 1 Gram Ivpb)  1 gm in 100 mls @ 100 mls/hr IVPB 

DAILY [facility]


   PRN Reason: Protocol


   Stop: 05/19/18 15:01


   Last Admin: 05/16/18 15:54 Dose:  100 mls/hr


Metoprolol Tartrate (Lopressor)  100 mg PO BRK [facility]


   Last Admin: 05/16/18 08:41 Dose:  Not Given


Pantoprazole Sodium (Protonix Ec Tab)  40 mg PO 0600 [facility]


   Last Admin: 05/17/18 06:15 Dose:  40 mg


Spironolactone (Aldactone)  25 mg PO BID [facility]


   Last Admin: 05/16/18 18:36 Dose:  25 mg


Tramadol HCl (Ultram)  50 mg PO Q6H PRN


   PRN Reason: Pain, moderate (4-7)


   Last Admin: 05/16/18 18:36 Dose:  50 mg


Zolpidem Tartrate (Ambien)  10 mg PO HS PRN; Protocol


   PRN Reason: Insomnia


   Last Admin: 05/16/18 22:10 Dose:  10 mg











- Labs


Labs: 


 





 05/17/18 05:30 





 05/17/18 05:30 





 











PT  30.0 SECONDS (9.4-12.5)  H  05/17/18  05:30    


 


INR  2.56  (0.93-1.08)  H  05/17/18  05:30    


 


APTT  38.4 Seconds (25.1-36.5)  H  05/15/18  14:30    














Assessment and Plan





- Assessment and Plan (Free Text)


Assessment: 





Dyspnea/50 lb weight gain/ increased ascites and edema


Chronic Lymphedema and Elephantiasis/H/O cellulitis


Right Heart Failure with moderate TR and mod/sev PH


Chronic AF on warfarin


Possible UTI


HBP


Morbid Obesity


Cirrhosis. etoh


H/O Gastric Bypass


Former Smoker


Daily ETOH


GERD








Plan:





Continue Lasix IV and spironolactone


OOB as oli


Monitor I/O, labs, INRs, sats., weights, etc


Will follow.


D/C ETOH

## 2018-05-17 NOTE — CP.PCM.DIS
<Quique Ambrosio - Last Filed: 05/17/18 13:33>





Provider





- Provider


Date of Admission: 


05/16/18 11:53





Attending physician: 


Elvira Winn MD





Primary care physician: 


Nawaf Hampton MD





Consults: 


Dr. Dawson Cardiology


 Dr. Siva MUNROE





Time Spent in preparation of Discharge (in minutes): 35





Hospital Course





- Lab Results


Lab Results: 


 Most Recent Lab Values











WBC  5.2 10^3/ul (4.5-11.0)   05/17/18  05:30    


 


RBC  3.90 10^6/uL (3.5-6.1)   05/17/18  05:30    


 


Hgb  12.0 g/dL (14.0-18.0)  L  05/17/18  05:30    


 


Hct  37.6 % (42.0-52.0)  L  05/17/18  05:30    


 


MCV  96.4 fl (80.0-105.0)   05/17/18  05:30    


 


MCH  30.8 pg (25.0-35.0)   05/17/18  05:30    


 


MCHC  31.9 g/dl (31.0-37.0)   05/17/18  05:30    


 


RDW  14.1 % (11.5-14.5)   05/17/18  05:30    


 


Plt Count  179 10^3/uL (120.0-450.0)   05/17/18  05:30    


 


MPV  10.0 fl (7.0-11.0)   05/17/18  05:30    


 


Gran %  60.2 % (50.0-68.0)   05/17/18  05:30    


 


Lymph % (Auto)  21.8 % (22.0-35.0)  L  05/17/18  05:30    


 


Mono % (Auto)  11.2 % (1.0-6.0)  H  05/17/18  05:30    


 


Eos % (Auto)  6.0 % (1.5-5.0)  H  05/17/18  05:30    


 


Baso % (Auto)  0.8 % (0.0-3.0)   05/17/18  05:30    


 


Gran #  3.12  (1.4-6.5)   05/17/18  05:30    


 


Lymph # (Auto)  1.1  (1.2-3.4)  L  05/17/18  05:30    


 


Mono # (Auto)  0.6  (0.1-0.6)   05/17/18  05:30    


 


Eos # (Auto)  0.3  (0.0-0.7)   05/17/18  05:30    


 


Baso # (Auto)  0.04 K/mm3 (0.0-2.0)   05/17/18  05:30    


 


PT  30.0 SECONDS (9.4-12.5)  H  05/17/18  05:30    


 


INR  2.56  (0.93-1.08)  H  05/17/18  05:30    


 


APTT  38.4 Seconds (25.1-36.5)  H  05/15/18  14:30    


 


Sodium  138 mmol/L (132-148)   05/17/18  05:30    


 


Potassium  4.6 mmol/L (3.6-5.0)   05/17/18  05:30    


 


Chloride  100 mmol/L ()   05/17/18  05:30    


 


Carbon Dioxide  27 mmol/L (21-33)   05/17/18  05:30    


 


Anion Gap  16  (10-20)   05/17/18  05:30    


 


BUN  16 mg/dL (7-21)   05/17/18  05:30    


 


Creatinine  0.8 mg/dl (0.8-1.5)   05/17/18  05:30    


 


Est GFR ( Amer)  > 60   05/17/18  05:30    


 


Est GFR (Non-Af Amer)  > 60   05/17/18  05:30    


 


Random Glucose  90 mg/dL ()   05/17/18  05:30    


 


Calcium  9.0 mg/dL (8.4-10.5)   05/17/18  05:30    


 


Magnesium  1.9 mg/dL (1.7-2.2)   05/15/18  14:30    


 


Total Bilirubin  3.0 mg/dL (0.2-1.3)  H  05/17/18  05:30    


 


AST  32 U/L (17-59)   05/17/18  05:30    


 


ALT  29 U/L (7-56)   05/17/18  05:30    


 


Alkaline Phosphatase  82 U/L ()   05/17/18  05:30    


 


Lactate Dehydrogenase  457 U/L (333-699)   05/15/18  14:30    


 


Total Creatine Kinase  80 U/L ()   05/15/18  14:30    


 


Troponin I  < 0.01 ng/mL  05/15/18  14:30    


 


NT-Pro-B Natriuret Pep  1110 pg/mL (0-450)  H  05/15/18  14:30    


 


Total Protein  6.8 g/dL (5.8-8.3)   05/17/18  05:30    


 


Albumin  3.9 g/dL (3.0-4.8)   05/17/18  05:30    


 


Globulin  2.9 gm/dL  05/17/18  05:30    


 


Albumin/Globulin Ratio  1.4  (1.1-1.8)   05/17/18  05:30    


 


Lipase  162 U/L ()   05/15/18  14:30    


 


Urine Color  Yellow  (YELLOW)   05/16/18  12:10    


 


Urine Appearance  Clear  (CLEAR)   05/16/18  12:10    


 


Urine pH  5.5  (4.7-8.0)   05/16/18  12:10    


 


Ur Specific Gravity  <= 1.005  (1.005-1.035)   05/16/18  12:10    


 


Urine Protein  Negative mg/dL (<30 mg/dL)   05/16/18  12:10    


 


Urine Glucose (UA)  Negative mg/dL (NEGATIVE)   05/16/18  12:10    


 


Urine Ketones  Negative mg/dL (NEGATIVE)   05/16/18  12:10    


 


Urine Blood  Negative  (NEGATIVE)   05/16/18  12:10    


 


Urine Nitrate  Negative  (NEGATIVE)   05/16/18  12:10    


 


Urine Bilirubin  Negative  (NEGATIVE)   05/16/18  12:10    


 


Urine Urobilinogen  0.2 E.U./dL (<1 E.U./dL)   05/16/18  12:10    


 


Ur Leukocyte Esterase  Small Salvador/uL (NEGATIVE)  H  05/16/18  12:10    


 


Urine RBC  0 - 2 /hpf (0-2)   05/16/18  12:10    


 


Urine WBC  5 - 10 /hpf (0-6)   05/16/18  12:10    


 


Ur Epithelial Cells  None /hpf (0-5)   05/16/18  12:10    


 


Urine Bacteria  Few  (NEG)   05/16/18  12:10    


 


Digoxin  1.0 ng/mL (0.8-2.0)   05/15/18  14:30    














- Hospital Course


Hospital Course: 


58 year old male with past medical history of ascites, CHF, atrial fibrillation

, morbid obesity, lymphadema, GERD, Pulmonary HTN who presented with shortness 

of breath. Patient states shortness of breath began yesterday. When he tries to 

ambulate a small distance he feels like he can't breath and he has also noted 

his belly has been getting gradually more distended. He was prescribed a 

diuretic but did not use it secondary to it causing him to urinate to 

frequently. In the ED he was found to have a supratherapeutic INR, an elevated 

BNP, and a UTI. Interventional radiology drained 3.1 L of serosanguinous fluid 

while cardiology re-instated diuretic therapy. An Echocardiogram showed 

moderate to severe tricuspid regurgitation and severe pulmonary hypertension. 

The patient was treated with IV Rocephin for the UTI; but switched to PO 

ciprofloxacin upon discharge. He was discharged with the below written 

instruction and prescriptions, including, but not limited to, 


1) Patient to follow with Dr. Hampton or his Primary Medical Doctor within 

one week of discharge and get kidney function and electrolytes checked. 


2) Patient to check weight daily. take all medications as prescribed, unless 

otherwise indicated.


3) Patient to get INR rechecked and adjust dosage of Warfarin with PMD.


4) Patient to obtain outpatient sleep study. Your PMD should arrange for this.


5) Patient to abstain from alcohol, tobacco, and drug use.


6) Patient to limit fluid intake to less than 1500 ml a day


7) Patient to eat a diet that contains less than 2 grams of Na each day.


8) Patient to take any prescriptions as directed, unless otherwise indicated.


9) You were also found to have a UTI. Take the Ciprofloxacin as directed.


10) Patient has an appointment with Dr. Dawson at 2:45 PM on 5/22/18.








- Date & Time of H&P


Date of H&P: 05/10/18


Time of H&P: 13:21





Discharge Exam





- Head Exam


Head Exam: ATRAUMATIC, NORMOCEPHALIC





- Eye Exam


Eye Exam: EOMI, Normal appearance





- ENT Exam


ENT Exam: Mucous Membranes Moist, Normal Oropharynx





- Neck Exam


Neck exam: Normal Inspection





- Respiratory Exam


Respiratory Exam: Clear to PA & Lateral, NORMAL BREATHING PATTERN.  absent: 

Accessory Muscle Use





- Cardiovascular Exam


Cardiovascular Exam: RRR, +S1, +S2





- GI/Abdominal Exam


GI & Abdominal Exam: Normal Bowel Sounds.  absent: Guarding





- Extremities Exam


Extremities exam: pedal edema





- Neurological Exam


Neurological exam: Alert, CN II-XII Intact, Oriented x3





- Psychiatric Exam


Psychiatric exam: Normal Affect, Normal Mood





- Skin


Skin Exam: Dry, Intact, Normal Color, Warm





Discharge Plan





- Discharge Medications


Prescriptions: 


Ciprofloxacin HCl [Cipro] 250 mg PO Q12H #6 tablet


Digoxin [Lanoxin] 0.25 mg PO DAILY #30 tab


Furosemide [Lasix] 40 mg PO DAILY #30 tablet


Spironolactone [Aldactone] 100 mg PO ONCE #30 tab


Warfarin [Coumadin] 2 mg PO 1800 #15 tab





- Follow Up Plan


Condition: STABLE


Disposition: HOME/ ROUTINE


Instructions:  Abdominal Paracentesis, Heart Failure (DC), Heart Failure (GEN), 

Pacemaker (DC), Pacemaker (GEN), Pulmonary Edema (DC), Pulmonary Edema (GEN), 

Ascites (DC), Ascites (GEN)


Additional Instructions: 


1) Patient to follow with Dr. Hampton or Primary Medical Doctor within one 

week of discharge and get kidney function and electrolytes checked. 


2) Patient to check weight daily. take all medications as prescribed.


3) Patient to get INR rechecked and adjust dosage of Warfarin with PMD.


4) Patient to obtain outpatient sleep study. Your PMD should arrange for this.


5) Patient to abstain from alcohol, tobacco, and drug use.


6) Patient to limit fluid intake to less than 1500 ml a day


7) Patient to eat a diet that contains less than 2 grams of Na each day.


8) Patient to take any prescriptions as directed, unless otherwise indicated.


9) You were also found to have a UTI. Take the Ciprofloxacin as directed.








Nursing





Follow up in Dr. Dawson office on  Tuesday 5/22/18 at 2:45 pm , the 

appointment has already been made.





If you begin to experience chest pain, shortness of breath, your symptoms 

return or any changes return to the emergency room or call 911.





See care notes provided for further instructions.





Referrals: 


Nawaf Hampton MD [Primary Care Provider] - 





Clinical Quality Measures





- CQM - Heart Failure


Ejection Fraction: 40 % or Greater


Left Ventricular Function to be assessed after discharge: No


Beta-Blocker Prescribed: Metoprolol Succinate


Contraindication/Reason for not providing: ascites


AnticoagulationTherapy for Atrial Fibrillation/Atrialflutter: Yes


Cardiac Resynchronization Therapy Prescribed: No


Contraindication/Reason for not providing: not indicated


Follow Up Date (must be within 7 days from discharge): 05/22/18


Follow Up Time: 09:00





<Elvira Winn - Last Filed: 05/17/18 18:46>





Provider





- Provider


Date of Admission: 


05/16/18 11:53





Attending physician: 


Elvira Winn MD





Primary care physician: 


Nawaf Hampton MD








Hospital Course





- Lab Results


Lab Results: 


 Most Recent Lab Values











WBC  5.2 10^3/ul (4.5-11.0)   05/17/18  05:30    


 


RBC  3.90 10^6/uL (3.5-6.1)   05/17/18  05:30    


 


Hgb  12.0 g/dL (14.0-18.0)  L  05/17/18  05:30    


 


Hct  37.6 % (42.0-52.0)  L  05/17/18  05:30    


 


MCV  96.4 fl (80.0-105.0)   05/17/18  05:30    


 


MCH  30.8 pg (25.0-35.0)   05/17/18  05:30    


 


MCHC  31.9 g/dl (31.0-37.0)   05/17/18  05:30    


 


RDW  14.1 % (11.5-14.5)   05/17/18  05:30    


 


Plt Count  179 10^3/uL (120.0-450.0)   05/17/18  05:30    


 


MPV  10.0 fl (7.0-11.0)   05/17/18  05:30    


 


Gran %  60.2 % (50.0-68.0)   05/17/18  05:30    


 


Lymph % (Auto)  21.8 % (22.0-35.0)  L  05/17/18  05:30    


 


Mono % (Auto)  11.2 % (1.0-6.0)  H  05/17/18  05:30    


 


Eos % (Auto)  6.0 % (1.5-5.0)  H  05/17/18  05:30    


 


Baso % (Auto)  0.8 % (0.0-3.0)   05/17/18  05:30    


 


Gran #  3.12  (1.4-6.5)   05/17/18  05:30    


 


Lymph # (Auto)  1.1  (1.2-3.4)  L  05/17/18  05:30    


 


Mono # (Auto)  0.6  (0.1-0.6)   05/17/18  05:30    


 


Eos # (Auto)  0.3  (0.0-0.7)   05/17/18  05:30    


 


Baso # (Auto)  0.04 K/mm3 (0.0-2.0)   05/17/18  05:30    


 


PT  30.0 SECONDS (9.4-12.5)  H  05/17/18  05:30    


 


INR  2.56  (0.93-1.08)  H  05/17/18  05:30    


 


APTT  38.4 Seconds (25.1-36.5)  H  05/15/18  14:30    


 


Sodium  138 mmol/L (132-148)   05/17/18  05:30    


 


Potassium  4.6 mmol/L (3.6-5.0)   05/17/18  05:30    


 


Chloride  100 mmol/L ()   05/17/18  05:30    


 


Carbon Dioxide  27 mmol/L (21-33)   05/17/18  05:30    


 


Anion Gap  16  (10-20)   05/17/18  05:30    


 


BUN  16 mg/dL (7-21)   05/17/18  05:30    


 


Creatinine  0.8 mg/dl (0.8-1.5)   05/17/18  05:30    


 


Est GFR ( Amer)  > 60   05/17/18  05:30    


 


Est GFR (Non-Af Amer)  > 60   05/17/18  05:30    


 


Random Glucose  90 mg/dL ()   05/17/18  05:30    


 


Calcium  9.0 mg/dL (8.4-10.5)   05/17/18  05:30    


 


Magnesium  1.9 mg/dL (1.7-2.2)   05/15/18  14:30    


 


Total Bilirubin  3.0 mg/dL (0.2-1.3)  H  05/17/18  05:30    


 


AST  32 U/L (17-59)   05/17/18  05:30    


 


ALT  29 U/L (7-56)   05/17/18  05:30    


 


Alkaline Phosphatase  82 U/L ()   05/17/18  05:30    


 


Lactate Dehydrogenase  457 U/L (333-699)   05/15/18  14:30    


 


Total Creatine Kinase  80 U/L ()   05/15/18  14:30    


 


Troponin I  < 0.01 ng/mL  05/15/18  14:30    


 


NT-Pro-B Natriuret Pep  1110 pg/mL (0-450)  H  05/15/18  14:30    


 


Total Protein  6.8 g/dL (5.8-8.3)   05/17/18  05:30    


 


Albumin  3.9 g/dL (3.0-4.8)   05/17/18  05:30    


 


Globulin  2.9 gm/dL  05/17/18  05:30    


 


Albumin/Globulin Ratio  1.4  (1.1-1.8)   05/17/18  05:30    


 


Lipase  162 U/L ()   05/15/18  14:30    


 


Urine Color  Yellow  (YELLOW)   05/16/18  12:10    


 


Urine Appearance  Clear  (CLEAR)   05/16/18  12:10    


 


Urine pH  5.5  (4.7-8.0)   05/16/18  12:10    


 


Ur Specific Gravity  <= 1.005  (1.005-1.035)   05/16/18  12:10    


 


Urine Protein  Negative mg/dL (<30 mg/dL)   05/16/18  12:10    


 


Urine Glucose (UA)  Negative mg/dL (NEGATIVE)   05/16/18  12:10    


 


Urine Ketones  Negative mg/dL (NEGATIVE)   05/16/18  12:10    


 


Urine Blood  Negative  (NEGATIVE)   05/16/18  12:10    


 


Urine Nitrate  Negative  (NEGATIVE)   05/16/18  12:10    


 


Urine Bilirubin  Negative  (NEGATIVE)   05/16/18  12:10    


 


Urine Urobilinogen  0.2 E.U./dL (<1 E.U./dL)   05/16/18  12:10    


 


Ur Leukocyte Esterase  Small Salvador/uL (NEGATIVE)  H  05/16/18  12:10    


 


Urine RBC  0 - 2 /hpf (0-2)   05/16/18  12:10    


 


Urine WBC  5 - 10 /hpf (0-6)   05/16/18  12:10    


 


Ur Epithelial Cells  None /hpf (0-5)   05/16/18  12:10    


 


Urine Bacteria  Few  (NEG)   05/16/18  12:10    


 


Digoxin  1.0 ng/mL (0.8-2.0)   05/15/18  14:30    














Attending/Attestation





- Attestation


I have personally seen and examined this patient.: Yes


I have fully participated in the care of the patient.: Yes


I have reviewed all pertinent clinical information, including history, physical 

exam and plan: Yes


Notes (Text): 





05/17/18 18:41


Medical record note made by the resident after discussion with my direction and 

input after the patient was personally seen and examined by me. I have reviewed 

the chart and agree that the record accurately reflects by personal performance 

of the history, physical exam, data review, and medical decision-making, in the 

course for the patient. I have also personally directed the plan of care





58 year old male with past medical history of Morbid Obesity,HTN, diastolic  CHF

, atrial fibrillation on anticoagulation with warfarin, , lymphadema, GERD, and 

Pulmonary HTN was admitted with dyspnea on exertion and worsening abdominal 

swelling.Patient dyspnea was due to fluid overload, he was treated with  IV 

lasix and aldactone.2D Echo showed severe Pulmonary HTN .He had Paracentesis 

yesterday and is feeling better.He is ambulatory and is on room air.He has been 

started on oral lasix and aldactone.He has sevre Pulmonary HTN due to TALYA and 

obesity Hypoventilation syndrome.He need sleep study as out patient.This was 

discussed in detail with him.He was also advised to take his lasix and 

aldactone..The issue of compliance with medication was discussed in 

detail.Patient warfarin dose is reduced to 2 mg daily and he he will need 

repeat INR on Monday 05/21/19, his dose may need to be changed depending on INR.


 





Management plan was discussed in detail with patient. Education was provided.

## 2018-07-25 ENCOUNTER — HOSPITAL ENCOUNTER (INPATIENT)
Dept: HOSPITAL 42 - ED | Age: 58
LOS: 2 days | Discharge: HOME | DRG: 433 | End: 2018-07-27
Attending: INTERNAL MEDICINE | Admitting: INTERNAL MEDICINE
Payer: MEDICARE

## 2018-07-25 VITALS — BODY MASS INDEX: 57.1 KG/M2

## 2018-07-25 DIAGNOSIS — Z82.49: ICD-10-CM

## 2018-07-25 DIAGNOSIS — K70.9: ICD-10-CM

## 2018-07-25 DIAGNOSIS — K70.31: Primary | ICD-10-CM

## 2018-07-25 DIAGNOSIS — I07.1: ICD-10-CM

## 2018-07-25 DIAGNOSIS — D68.9: ICD-10-CM

## 2018-07-25 DIAGNOSIS — Z79.01: ICD-10-CM

## 2018-07-25 DIAGNOSIS — Z87.891: ICD-10-CM

## 2018-07-25 DIAGNOSIS — I27.20: ICD-10-CM

## 2018-07-25 DIAGNOSIS — Z82.3: ICD-10-CM

## 2018-07-25 DIAGNOSIS — I50.32: ICD-10-CM

## 2018-07-25 DIAGNOSIS — E78.00: ICD-10-CM

## 2018-07-25 DIAGNOSIS — Z91.14: ICD-10-CM

## 2018-07-25 DIAGNOSIS — Z83.3: ICD-10-CM

## 2018-07-25 DIAGNOSIS — I11.0: ICD-10-CM

## 2018-07-25 DIAGNOSIS — I89.0: ICD-10-CM

## 2018-07-25 DIAGNOSIS — Z98.84: ICD-10-CM

## 2018-07-25 DIAGNOSIS — E87.1: ICD-10-CM

## 2018-07-25 DIAGNOSIS — I48.2: ICD-10-CM

## 2018-07-25 DIAGNOSIS — Z86.718: ICD-10-CM

## 2018-07-25 DIAGNOSIS — E66.01: ICD-10-CM

## 2018-07-25 DIAGNOSIS — G47.00: ICD-10-CM

## 2018-07-25 DIAGNOSIS — K21.9: ICD-10-CM

## 2018-07-25 LAB
ALBUMIN SERPL-MCNC: 3.8 G/DL (ref 3–4.8)
ALBUMIN/GLOB SERPL: 1.2 {RATIO} (ref 1.1–1.8)
ALT SERPL-CCNC: 28 U/L (ref 7–56)
APTT BLD: 33.8 SECONDS (ref 25.1–36.5)
AST SERPL-CCNC: 27 U/L (ref 17–59)
BASOPHILS # BLD AUTO: 0.09 K/MM3 (ref 0–2)
BASOPHILS NFR BLD: 1 % (ref 0–3)
BNP SERPL-MCNC: 1600 PG/ML (ref 0–450)
BUN SERPL-MCNC: 12 MG/DL (ref 7–21)
CALCIUM SERPL-MCNC: 8.8 MG/DL (ref 8.4–10.5)
EOSINOPHIL # BLD: 0.4 10*3/UL (ref 0–0.7)
EOSINOPHIL NFR BLD: 4.7 % (ref 1.5–5)
ERYTHROCYTE [DISTWIDTH] IN BLOOD BY AUTOMATED COUNT: 15.6 % (ref 11.5–14.5)
GFR NON-AFRICAN AMERICAN: > 60
GRANULOCYTES # BLD: 5.89 10*3/UL (ref 1.4–6.5)
GRANULOCYTES NFR BLD: 66.6 % (ref 50–68)
HGB BLD-MCNC: 10.7 G/DL (ref 14–18)
INR PPP: 2.01 (ref 0.93–1.08)
LYMPHOCYTES # BLD: 1.7 10*3/UL (ref 1.2–3.4)
LYMPHOCYTES NFR BLD AUTO: 18.8 % (ref 22–35)
MCH RBC QN AUTO: 28.9 PG (ref 25–35)
MCHC RBC AUTO-ENTMCNC: 33 G/DL (ref 31–37)
MCV RBC AUTO: 87.6 FL (ref 80–105)
MONOCYTES # BLD AUTO: 0.8 10*3/UL (ref 0.1–0.6)
MONOCYTES NFR BLD: 8.9 % (ref 1–6)
PLATELET # BLD: 305 10^3/UL (ref 120–450)
PMV BLD AUTO: 9.5 FL (ref 7–11)
PROTHROMBIN TIME: 23.2 SECONDS (ref 9.4–12.5)
RBC # BLD AUTO: 3.7 10^6/UL (ref 3.5–6.1)
WBC # BLD AUTO: 8.9 10^3/UL (ref 4.5–11)

## 2018-07-25 NOTE — CP.PCM.HP
Addendum entered and electronically signed by Quique Ambrosio DO  07/26/18 06:44

: 


IR consult rescinded, will convey to primary team








Original Note:








<Quique Ambrosio - Last Filed: 07/25/18 20:30>





History of Present Illness





- History of Present Illness


History of Present Illness: 


Quique Ambrosio DO, PGY-2: HPI for Dr. Winn, Hospitalist Service 





58 year old male with past medical history of ascites, CHF, atrial fibrillation

, morbid obesity, lymphedema, GERD, Pulmonary HTN who presented with shortness 

of breath. Patient states shortness of breath began yesterday. When he tries to 

ambulate a small distance he feels like he can't breath and he has also noted 

his belly has been getting gradually more distended. He was prescribed a 

Spirinolactone but did not use it secondary to it causing him to urinate to 

frequently. In the ED he was found to have an elevated BNP. Interventional 

radiology and cardiology were consulted. On his last admission 3.1 L of 

serosanguinous fluid was drained. An Echocardiogram showed moderate to severe 

tricuspid regurgitation and severe pulmonary hypertension. He reports gaining 

90 plus pounds since the last time he has been tapped. He denies any fever, 

chills, nausea, vomiting, diarrhea or chest pain.





PMH: CHF (distolic dysfunction), atrial fibrillation, morbid obesity, lymphadema

, GERD, Pulmonary HTN


Med: Coumadin 2.5 mg PO daily, Metoprolol 100 mg PO daily, Digoxin 0.25 PO daily

, Tramadol 50 mg PO TID PRN, folic acid 


Allergy: NKDA


PSH: IVC filter placement (2011), Cardiac Ablation (2000), Gastric bypass (2000)


Hosp: paracentesis by Dr. Paniagua in May


FH: Mother - CVA, DM; Father - CVA, HTN


Social: bottle of wine over 3-4 days, smoked a pack a day, currently is not 

smoking; he denies illicit drug use.














Present on Admission





- Present on Admission


Any Indicators Present on Admission: No





Review of Systems





- Review of Systems


All systems: reviewed and no additional remarkable complaints except (as per HPI

)





Past Patient History





- Infectious Disease


Hx of Infectious Diseases: None





- Tetanus Immunizations


Tetanus Immunization: Unknown





- Past Social History


Smoking Status: Former Smoker





- CARDIAC


Hx Cardiac Disorders: Yes


Hx Hypertension: Yes





- PULMONARY


Hx Respiratory Disorders: No





- NEUROLOGICAL


Hx Neurological Disorder: No





- HEENT


Hx HEENT Problems: No





- RENAL


Hx Chronic Kidney Disease: No





- ENDOCRINE/METABOLIC


Hx Endocrine Disorders: No





- HEMATOLOGICAL/ONCOLOGICAL


Hx Blood Disorders: No





- INTEGUMENTARY


Hx Dermatological Problems: No





- MUSCULOSKELETAL/RHEUMATOLOGICAL


Hx Musculoskeletal Disorders: Yes


Hx Falls: No


Hx Herniated Disk: Yes





- GASTROINTESTINAL


Hx Gastrointestinal Disorders: Yes





- GENITOURINARY/GYNECOLOGICAL


Hx Genitourinary Disorders: No





- PSYCHIATRIC


Hx Psychophysiologic Disorder: No


Hx Substance Use: No





- SURGICAL HISTORY


Hx Gastric Bypass Surgery: Yes (2000)


Other/Comment: Cardiac ablation for atrial fibrillation





- ANESTHESIA


Hx Anesthesia Reactions: No


Hx Malignant Hyperthermia: No





Meds


Allergies/Adverse Reactions: 


 Allergies











Allergy/AdvReac Type Severity Reaction Status Date / Time


 


No Known Allergies Allergy   Verified 07/25/18 19:47














Physical Exam





- Constitutional


Appears: Non-toxic





- Head Exam


Head Exam: ATRAUMATIC, NORMOCEPHALIC





- Eye Exam


Eye Exam: EOMI, Normal appearance





- ENT Exam


ENT Exam: Mucous Membranes Moist





- Neck Exam


Neck exam: Positive for: Normal Inspection





- Respiratory Exam


Respiratory Exam: Clear to Auscultation Bilateral, NORMAL BREATHING PATTERN.  

absent: Accessory Muscle Use





- Cardiovascular Exam


Cardiovascular Exam: RRR, +S1, +S2





- GI/Abdominal Exam


GI & Abdominal Exam: Distended.  absent: Rebound





- Extremities Exam


Additional comments: 


4/4 pitting edema








- Back Exam


Back exam: NORMAL INSPECTION.  absent: CVA tenderness (L), CVA tenderness (R)





- Neurological Exam


Neurological exam: Alert, CN II-XII Intact, Oriented x3





- Psychiatric Exam


Psychiatric exam: Normal Affect, Normal Mood





- Skin


Skin Exam: Dry, Intact, Normal Color, Warm


Additional comments: 





LE are reddened





Results





- Vital Signs


Recent Vital Signs: 





 Last Vital Signs











Temp  98.2 F   07/25/18 15:05


 


Pulse  73   07/25/18 16:40


 


Resp  18   07/25/18 16:40


 


BP  143/65   07/25/18 16:40


 


Pulse Ox  98   07/25/18 16:40














- Labs


Result Diagrams: 


 07/25/18 15:40





 07/25/18 16:08


Labs: 





 Laboratory Results - last 24 hr











  07/25/18 07/25/18 07/25/18





  15:40 15:40 16:08


 


WBC  8.9  D  


 


RBC  3.70  


 


Hgb  10.7 L  


 


Hct  32.4 L  


 


MCV  87.6  D  


 


MCH  28.9  


 


MCHC  33.0  


 


RDW  15.6 H  


 


Plt Count  305  


 


MPV  9.5  


 


Gran %  66.6  


 


Lymph % (Auto)  18.8 L  


 


Mono % (Auto)  8.9 H  


 


Eos % (Auto)  4.7  


 


Baso % (Auto)  1.0  


 


Gran #  5.89  


 


Lymph # (Auto)  1.7  


 


Mono # (Auto)  0.8 H  


 


Eos # (Auto)  0.4  


 


Baso # (Auto)  0.09  


 


PT   23.2 H 


 


INR   2.01 H 


 


APTT   33.8 


 


Sodium    131 L


 


Potassium    4.9


 


Chloride    93 L


 


Carbon Dioxide    28


 


Anion Gap    16


 


BUN    12


 


Creatinine    0.7 L


 


Est GFR ( Amer)    > 60


 


Est GFR (Non-Af Amer)    > 60


 


Random Glucose    92


 


Calcium    8.8


 


Total Bilirubin    2.6 H


 


AST    27


 


ALT    28


 


Alkaline Phosphatase    95


 


NT-Pro-B Natriuret Pep    1600 H


 


Total Protein    6.9


 


Albumin    3.8


 


Globulin    3.1


 


Albumin/Globulin Ratio    1.2


 


Digoxin   














  07/25/18





  16:08


 


WBC 


 


RBC 


 


Hgb 


 


Hct 


 


MCV 


 


MCH 


 


MCHC 


 


RDW 


 


Plt Count 


 


MPV 


 


Gran % 


 


Lymph % (Auto) 


 


Mono % (Auto) 


 


Eos % (Auto) 


 


Baso % (Auto) 


 


Gran # 


 


Lymph # (Auto) 


 


Mono # (Auto) 


 


Eos # (Auto) 


 


Baso # (Auto) 


 


PT 


 


INR 


 


APTT 


 


Sodium 


 


Potassium 


 


Chloride 


 


Carbon Dioxide 


 


Anion Gap 


 


BUN 


 


Creatinine 


 


Est GFR ( Amer) 


 


Est GFR (Non-Af Amer) 


 


Random Glucose 


 


Calcium 


 


Total Bilirubin 


 


AST 


 


ALT 


 


Alkaline Phosphatase 


 


NT-Pro-B Natriuret Pep 


 


Total Protein 


 


Albumin 


 


Globulin 


 


Albumin/Globulin Ratio 


 


Digoxin  1.3














Assessment & Plan





- Assessment and Plan (Free Text)


Assessment: 


58 year old male presenting with dyspnea and ascites





1) CHF with ascites


-  Lasix 40 mg IVP BID


- Spirinolactone 50 mg 


- Strict I/O 


- Daily weights


- Heart healthy diet


- Na 2 gram 


- 1200 fluid restriction


- Dr. Arrignton consulted


- GI consulted


- [place] Paniagua consulted for Paracentesis





2) Atrial fibrillation


- Continue Metoprolol Succinate 100 mg PO daily (hold if SBP is less than 105 

or HR is less than 50)


- Warfarin to be held until afer paracentesis; INR in ED was 2.00


- Digoxin 0.25





3) DVT/GI prophylaxis


- SCD


- Pepcid 20 mg PO HS





Case was reviewed and discussed with attending physician, Dr. Winn











- Date & Time


Date: 07/25/18


Time: 20:38





<Elvira Winn - Last Filed: 07/26/18 18:11>





Results





- Vital Signs


Recent Vital Signs: 





 Last Vital Signs











Temp  98.3 F   07/26/18 17:55


 


Pulse  90   07/26/18 17:55


 


Resp  20   07/26/18 17:55


 


BP  148/82   07/26/18 17:55


 


Pulse Ox  98   07/26/18 05:56














- Labs


Result Diagrams: 


 07/26/18 07:30





 07/26/18 07:30


Labs: 





 Laboratory Results - last 24 hr











  07/26/18 07/26/18 07/26/18





  07:30 07:30 07:30


 


WBC  8.2  


 


RBC  3.55  


 


Hgb  10.1 L  


 


Hct  31.3 L  


 


MCV  88.2  


 


MCH  28.5  


 


MCHC  32.3  


 


RDW  15.3 H  


 


Plt Count  230  


 


MPV  8.5  


 


Gran %  68.0  


 


Lymph % (Auto)  17.1 L  


 


Mono % (Auto)  9.3 H  


 


Eos % (Auto)  4.5  


 


Baso % (Auto)  1.1  


 


Gran #  5.56  


 


Lymph # (Auto)  1.4  


 


Mono # (Auto)  0.8 H  


 


Eos # (Auto)  0.4  


 


Baso # (Auto)  0.09  


 


PT   24.9 H 


 


INR   2.13 H 


 


Sodium    131 L


 


Potassium    4.3


 


Chloride    93 L


 


Carbon Dioxide    28


 


Anion Gap    14


 


BUN    13


 


Creatinine    0.8


 


Est GFR ( Amer)    > 60


 


Est GFR (Non-Af Amer)    > 60


 


Random Glucose    83


 


Calcium    8.7


 


Total Bilirubin    3.1 H


 


AST    25


 


ALT    29


 


Alkaline Phosphatase    83


 


Total Protein    6.4


 


Albumin    3.5


 


Globulin    3.0


 


Albumin/Globulin Ratio    1.2


 


Fluid Source   


 


Fluid Appearance   


 


Fluid WBC   


 


Fluid RBC   


 


Fluid Tot Cell Count   


 


Fluid Neutrophils   


 


Fluid Lymphocytes   


 


Fld Monocyte/Macrophag   


 


Fluid Comment   














  07/26/18





  17:34


 


WBC 


 


RBC 


 


Hgb 


 


Hct 


 


MCV 


 


MCH 


 


MCHC 


 


RDW 


 


Plt Count 


 


MPV 


 


Gran % 


 


Lymph % (Auto) 


 


Mono % (Auto) 


 


Eos % (Auto) 


 


Baso % (Auto) 


 


Gran # 


 


Lymph # (Auto) 


 


Mono # (Auto) 


 


Eos # (Auto) 


 


Baso # (Auto) 


 


PT 


 


INR 


 


Sodium 


 


Potassium 


 


Chloride 


 


Carbon Dioxide 


 


Anion Gap 


 


BUN 


 


Creatinine 


 


Est GFR ( Amer) 


 


Est GFR (Non-Af Amer) 


 


Random Glucose 


 


Calcium 


 


Total Bilirubin 


 


AST 


 


ALT 


 


Alkaline Phosphatase 


 


Total Protein 


 


Albumin 


 


Globulin 


 


Albumin/Globulin Ratio 


 


Fluid Source  Peritoneal


 


Fluid Appearance  Bloody


 


Fluid WBC  967.0 H


 


Fluid RBC  192927.0 H


 


Fluid Tot Cell Count  100 H


 


Fluid Neutrophils  14.2 H


 


Fluid Lymphocytes  85.8 H


 


Fld Monocyte/Macrophag  TEST NOT PERFORMED


 


Fluid Comment  Red/cloudy














Attending/Attestation





- Attestation


I have personally seen and examined this patient.: Yes


I have fully participated in the care of the patient.: Yes


I have reviewed all pertinent clinical information: Yes


Notes (Text): 





07/26/18 18:07


Medical record note made by the resident after discussion with my direction and 

input after the patient was personally seen and examined by me. I have reviewed 

the chart and agree that the record accurately reflects by personal performance 

of the history, physical exam, data review, and medical decision-making, in the 

course for the patient. I have also personally directed the plan of care





58 year old male with past medical history of Morbid Obesity,HTN, diastolic  CHF

, atrial fibrillation on  ORAL anticoagulation with warfarin, ,  chronic 

lymphadema, GERD, Pulmonary HTN  and non compliance with medication is admitted 

with dyspnea on exertion and worsening abdominal swelling .


Patient was non compliance with his diuretic.We will start patient on IV lasix, 

and aldactone.We will  hold warfarin.We will get IR consult for Paracentesis.


 





Management plan was discussed in detail with patient. Education was provided.

## 2018-07-25 NOTE — RAD
Date of service: 



07/25/2018



HISTORY:

dyspnea  



COMPARISON:

05/15/2018 



FINDINGS:



LUNGS:

No active pulmonary disease.



PLEURA:

No significant pleural effusion identified, no pneumothorax apparent.



CARDIOVASCULAR:

Normal.



OSSEOUS STRUCTURES:

No significant abnormalities.



VISUALIZED UPPER ABDOMEN:

Normal.



OTHER FINDINGS:

None.



IMPRESSION:

No active disease.

## 2018-07-26 LAB
ALBUMIN SERPL-MCNC: 3.5 G/DL (ref 3–4.8)
ALBUMIN/GLOB SERPL: 1.2 {RATIO} (ref 1.1–1.8)
ALT SERPL-CCNC: 29 U/L (ref 7–56)
APPEARANCE FLD: (no result)
AST SERPL-CCNC: 25 U/L (ref 17–59)
BASOPHILS # BLD AUTO: 0.09 K/MM3 (ref 0–2)
BASOPHILS NFR BLD: 1.1 % (ref 0–3)
BODY FLD TYPE: (no result)
BUN SERPL-MCNC: 13 MG/DL (ref 7–21)
CALCIUM SERPL-MCNC: 8.7 MG/DL (ref 8.4–10.5)
CELL CNT PNL FLD: 100 (ref 0–0)
EOSINOPHIL # BLD: 0.4 10*3/UL (ref 0–0.7)
EOSINOPHIL NFR BLD: 4.5 % (ref 1.5–5)
ERYTHROCYTE [DISTWIDTH] IN BLOOD BY AUTOMATED COUNT: 15.3 % (ref 11.5–14.5)
GFR NON-AFRICAN AMERICAN: > 60
GRANULOCYTES # BLD: 5.56 10*3/UL (ref 1.4–6.5)
GRANULOCYTES NFR BLD: 68 % (ref 50–68)
HGB BLD-MCNC: 10.1 G/DL (ref 14–18)
INR PPP: 2.13 (ref 0.93–1.08)
LYMPHOCYTES # BLD: 1.4 10*3/UL (ref 1.2–3.4)
LYMPHOCYTES NFR BLD AUTO: 17.1 % (ref 22–35)
MCH RBC QN AUTO: 28.5 PG (ref 25–35)
MCHC RBC AUTO-ENTMCNC: 32.3 G/DL (ref 31–37)
MCV RBC AUTO: 88.2 FL (ref 80–105)
MONOCYTES # BLD AUTO: 0.8 10*3/UL (ref 0.1–0.6)
MONOCYTES NFR BLD: 9.3 % (ref 1–6)
PLATELET # BLD: 230 10^3/UL (ref 120–450)
PMV BLD AUTO: 8.5 FL (ref 7–11)
PROTHROMBIN TIME: 24.9 SECONDS (ref 9.4–12.5)
RBC # BLD AUTO: 3.55 10^6/UL (ref 3.5–6.1)
WBC # BLD AUTO: 8.2 10^3/UL (ref 4.5–11)
WBC # FLD: 967 /UL (ref 0–300)

## 2018-07-26 PROCEDURE — 0W9G3ZZ DRAINAGE OF PERITONEAL CAVITY, PERCUTANEOUS APPROACH: ICD-10-PCS

## 2018-07-26 RX ADMIN — DIGOXIN SCH MG: 0.25 TABLET ORAL at 09:36

## 2018-07-26 NOTE — CARD
--------------- APPROVED REPORT --------------





Date of service: 07/25/2018



EKG Measurement

Heart [id number]

[id number]

[id number]

[id number]



<Conclusion>

Atrial fibrillation with slow ventricular response

Possible Anterior infarct, age undetermined

Abnormal ECG

## 2018-07-26 NOTE — CP.PCM.CON
<Adis Patten - Last Filed: 07/26/18 08:49>





History of Present Illness





- History of Present Illness


History of Present Illness: 





PGY-4 GI Fellow Consult Note





Pt is a 57 yo WM with Morbid Obesity, Cirrhosis (suspected EtOH, Cardiac) c/b 

ascites, pAF (on warfarin, s/p ablation), HFpEF (severe pHTN, RVSP 82, EF 61% 5/ 16/18), chronic lymphedema presenting with ***.





12 point ROS negative other than stated above





MHx: As stated above, plus h/o DVT s/p IVC FIlter


SurgHx: Gastric Bypass, IVC Filter placement


Meds: Warfarin, Metoprolol Tartrate, Furosemide 40 mg, Digoxin


FamHx: CVA, HTN


SocHx: Daily EtOH use, denied tobacco or illicits


All: NKDA








Past Patient History





- Infectious Disease


Hx of Infectious Diseases: None





- Tetanus Immunizations


Tetanus Immunization: Unknown





- Past Social History


Smoking Status: Former Smoker





- CARDIAC


Hx Cardiac Disorders: Yes (DVT GREENFILED FILTER.)


Hx Cardia Arrhythmia: Yes (CARDIAC ABLATION)


Hx Circulatory Problems: Yes


Hx Congestive Heart Failure: Yes


Hx Hypercholesterolemia: Yes


Hx Hypertension: Yes


Hx Peripheral Edema: Yes





- PULMONARY


Hx Respiratory Disorders: Yes (SMOKED CIGARETTES IN HIS TEENS.)





- NEUROLOGICAL


Hx Neurological Disorder: No





- HEENT


Hx HEENT Problems: No





- RENAL


Hx Chronic Kidney Disease: No





- ENDOCRINE/METABOLIC


Hx Endocrine Disorders: No





- HEMATOLOGICAL/ONCOLOGICAL


Hx Blood Disorders: No





- INTEGUMENTARY


Hx Dermatological Problems: Yes


Other/Comment: LARGE PANNUS,BILATERAL GROSS LYMPEDEMA.





- MUSCULOSKELETAL/RHEUMATOLOGICAL


Hx Musculoskeletal Disorders: Yes


Hx Falls: No


Hx Herniated Disk: Yes


Hx Unsteady Gait: Yes (CANE)





- GASTROINTESTINAL


Hx Gastrointestinal Disorders: Yes





- GENITOURINARY/GYNECOLOGICAL


Hx Genitourinary Disorders: No





- PSYCHIATRIC


Hx Psychophysiologic Disorder: No


Hx Substance Use: No





- SURGICAL HISTORY


Hx Surgeries: Yes (GASTRIC BYPASS,DVT-ORA FILTER)


Hx Gastric Bypass Surgery: Yes (2000)


Other/Comment: Cardiac ablation for atrial fibrillation





- ANESTHESIA


Hx Anesthesia Reactions: No


Hx Malignant Hyperthermia: No





Meds


Allergies/Adverse Reactions: 


 Allergies











Allergy/AdvReac Type Severity Reaction Status Date / Time


 


No Known Allergies Allergy   Verified 07/25/18 19:47














- Medications


Medications: 


 Current Medications





Digoxin (Lanoxin)  0.25 mg PO DAILY GRAC[facility]


Diphenhydramine HCl (Benadryl)  25 mg PO HS PRN


   PRN Reason: Insomnia


   Last Admin: 07/25/18 22:23 Dose:  25 mg


Famotidine (Pepcid)  20 mg PO HS [facility]AC[facility]


   Last Admin: 07/25/18 21:36 Dose:  20 mg


Furosemide (Lasix)  20 mg PO DAILY [facility]


Metoprolol Tartrate (Lopressor)  50 mg PO BID GRACIE


Spironolactone (Aldactone)  100 mg PO DAILY [facility]


Tramadol HCl (Ultram)  50 mg PO TID PRN


   PRN Reason: Pain, severe (8-10)


   Last Admin: 07/26/18 05:20 Dose:  50 mg











Physical Exam





- Constitutional


Appears: No Acute Distress, Older Than Stated Age, Chronically Ill


Additional comments: 





Morbidly Obese





- Head Exam


Head Exam: ATRAUMATIC, NORMAL INSPECTION





- Eye Exam


Eye Exam: EOMI.  absent: Conjunctival injection, Scleral icterus





- ENT Exam


ENT Exam: Mucous Membranes Moist, Normal External Ear Exam





- Respiratory Exam


Respiratory Exam: Clear to Auscultation Bilateral (though exam limited to body 

habitus), NORMAL BREATHING PATTERN.  absent: Accessory Muscle Use





- Cardiovascular Exam


Cardiovascular Exam: Irregular Rhythm (with split S2).  absent: Bradycardia, 

Tachycardia, Systolic Murmur





- GI/Abdominal Exam


GI & Abdominal Exam: Distended, Normal Bowel Sounds, Soft.  absent: Guarding, 

Tenderness


Additional comments: 





Exam limited due to body habitus, dull to percussion throughout other than LUQ 

which was tympanic, +umbiilcal veins, + flank fullness





- Neurological Exam


Neurological exam: Alert, CN II-XII Intact, Oriented x3





- Psychiatric Exam


Psychiatric exam: Normal Affect, Normal Mood





- Skin


Skin Exam: Dry (Chronic venous stasis changes bilaterally with extensive 

lymphedema in bilateral LEs), Warm





Results





- Vital Signs


Recent Vital Signs: 


 Last Vital Signs











Temp  98.4 F   07/26/18 05:56


 


Pulse  64   07/26/18 05:56


 


Resp  20   07/26/18 05:56


 


BP  133/69   07/26/18 08:21


 


Pulse Ox  98   07/26/18 05:56














- Labs


Result Diagrams: 


 07/26/18 07:30





 07/26/18 07:30


Labs: 


 Laboratory Results - last 24 hr











  07/26/18 07/26/18 07/26/18





  07:30 07:30 07:30


 


WBC  8.2  


 


RBC  3.55  


 


Hgb  10.1 L  


 


Hct  31.3 L  


 


MCV  88.2  


 


MCH  28.5  


 


MCHC  32.3  


 


RDW  15.3 H  


 


Plt Count  230  


 


MPV  8.5  


 


Gran %  68.0  


 


Lymph % (Auto)  17.1 L  


 


Mono % (Auto)  9.3 H  


 


Eos % (Auto)  4.5  


 


Baso % (Auto)  1.1  


 


Gran #  5.56  


 


Lymph # (Auto)  1.4  


 


Mono # (Auto)  0.8 H  


 


Eos # (Auto)  0.4  


 


Baso # (Auto)  0.09  


 


PT   24.9 H 


 


INR   2.13 H 


 


Sodium    131 L


 


Potassium    4.3


 


Chloride    93 L


 


Carbon Dioxide    28


 


Anion Gap    14


 


BUN    13


 


Creatinine    0.8


 


Est GFR ( Amer)    > 60


 


Est GFR (Non-Af Amer)    > 60


 


Random Glucose    83


 


Calcium    8.7


 


Total Bilirubin    3.1 H


 


AST    25


 


ALT    29


 


Alkaline Phosphatase    83


 


Total Protein    6.4


 


Albumin    3.5


 


Globulin    3.0


 


Albumin/Globulin Ratio    1.2














Assessment & Plan





- Assessment and Plan (Free Text)


Assessment: 





57 yo WM with h/o Cirrhosis (EtOH +/- Cardiac), HFpEF with Pulmonary HTN, 

Morbid Obesity presenting with worsening shortness of breath and increased 

abdominal distention.





Plan: 





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 

Abuse, inadequate diuretic regimen, dietary noncompliance.  Viral hep ruled out 

in 2016.


--- Paracentesis with cell count, albumin and protein lvl (help determine if 

candidate for SBP pp


------If >5 L fluid removed, pt to receive albumin 25% 6-8g/L removed


--- RUQ Doppler to check for PVT


--- Furosemide 20 mg Daily, Spironolactone 100 mg daily


- HCC: Needs triple phase CT or MRI to eval, can be done as OP


- Varices: Needs OP EGD


- HE: None apparent at this time


- Vaccines: Needs Hep A and B vaccination if not done


# Hyponatremia, Hypervolemic: Likely due to above and CHF.  Given cardiac 

disease with hyponatremia, recommend Cardiology consult as well to assist with 

diuresis.





Patient seen and examined with Dr. Reyes





<Jono Reyes - Last Filed: 07/26/18 11:36>





Meds





- Medications


Medications: 


 Current Medications





Digoxin (Lanoxin)  0.25 mg PO DAILY [facility]


   Last Admin: 07/26/18 09:36 Dose:  0.25 mg


Diphenhydramine HCl (Benadryl)  25 mg PO HS PRN


   PRN Reason: Insomnia


   Last Admin: 07/25/18 22:23 Dose:  25 mg


Famotidine (Pepcid)  20 mg PO HS [facility]


   Last Admin: 07/25/18 21:36 Dose:  20 mg


Furosemide (Lasix)  40 mg IVP BID [facility]


   Last Admin: 07/26/18 09:25 Dose:  Not Given


Metoprolol Tartrate (Lopressor)  50 mg PO BID [facility]


   Last Admin: 07/26/18 09:36 Dose:  50 mg


Spironolactone (Aldactone)  100 mg PO DAILY [facility]


   Last Admin: 07/26/18 09:36 Dose:  100 mg


Tramadol HCl (Ultram)  50 mg PO TID PRN


   PRN Reason: Pain, severe (8-10)


   Last Admin: 07/26/18 10:17 Dose:  50 mg











Results





- Vital Signs


Recent Vital Signs: 


 Last Vital Signs











Temp  98.4 F   07/26/18 05:56


 


Pulse  60   07/26/18 10:00


 


Resp  20   07/26/18 05:56


 


BP  133/69   07/26/18 08:21


 


Pulse Ox  98   07/26/18 05:56














- Labs


Result Diagrams: 


 07/26/18 07:30





 07/26/18 07:30


Labs: 


 Laboratory Results - last 24 hr











  07/26/18 07/26/18 07/26/18





  07:30 07:30 07:30


 


WBC  8.2  


 


RBC  3.55  


 


Hgb  10.1 L  


 


Hct  31.3 L  


 


MCV  88.2  


 


MCH  28.5  


 


MCHC  32.3  


 


RDW  15.3 H  


 


Plt Count  230  


 


MPV  8.5  


 


Gran %  68.0  


 


Lymph % (Auto)  17.1 L  


 


Mono % (Auto)  9.3 H  


 


Eos % (Auto)  4.5  


 


Baso % (Auto)  1.1  


 


Gran #  5.56  


 


Lymph # (Auto)  1.4  


 


Mono # (Auto)  0.8 H  


 


Eos # (Auto)  0.4  


 


Baso # (Auto)  0.09  


 


PT   24.9 H 


 


INR   2.13 H 


 


Sodium    131 L


 


Potassium    4.3


 


Chloride    93 L


 


Carbon Dioxide    28


 


Anion Gap    14


 


BUN    13


 


Creatinine    0.8


 


Est GFR ( Amer)    > 60


 


Est GFR (Non-Af Amer)    > 60


 


Random Glucose    83


 


Calcium    8.7


 


Total Bilirubin    3.1 H


 


AST    25


 


ALT    29


 


Alkaline Phosphatase    83


 


Total Protein    6.4


 


Albumin    3.5


 


Globulin    3.0


 


Albumin/Globulin Ratio    1.2














Attending/Attestation





- Attestation


I have personally seen and examined this patient.: Yes


I have fully participated in the care of the patient.: Yes


I have reviewed all pertinent clinical information: Yes


Notes (Text): 





07/26/18 11:25


57 year old male with history of Alcoholic cirrhosis, CHF, Pulmonary HTN, Afib, 

h/o DVT on coumadin, h/o RNY gastric bypass, lymphedema, cellulitis who 

presents  with SOB in setting of worsening ascites. Last paracentesis was in 

May 2016 with pulm HTN on ECHO and right sided failure. He has coagulopathy and 

hyponatremia and hence lasix should be discontinued but due to SOB will get 

cardiology to see him. Needs paracentesis with IR. Will refrain from giving FFP 

due to fluid overload. Low salt diet and triple phase scan to rule out HCC and 

abdominal sonogram to rule out PVT.

## 2018-07-26 NOTE — PCM.PROC
Procedures


Attestation:: I certify that I have explained the specified Operation(s) or 

Procedure(s), risks, benefits and reasonable alternatives to the Patient and/or 

other person responsible. The opportunity was given to ask questions and all 

questions answered





- Paracentesis


Consent Obtained: verbal consent, written consent


Time Out Performed: Yes


Indication: Ascites


Procedure: therapeutic paracentesis


Location: RLQ


Local Anesthetic Used: lidocaine 1%





Additional Comments





- Additional Comments


Additional Comments: 





Tremaine Jimenez D.O. PGY-3, Internal Medicine Resident





Fluid removed was serosangenous. 10ml drawn and was shown to patient, discussed 

with him and he states that during his previous paracenteses in the past the 

fluid has been the same color. Review of chart confirms this. 1 liter of 

serosangenous fluid was removed. Labs sent. No complications. Patient 

experienced mild relief of symptoms. 





Supervised/assisted Dr. Zion Schneider D.O. PGY-2, and supervised by Dr. Cesar Payne M.D.

## 2018-07-26 NOTE — CON
DATE:  07/26/2018



CONSULTATION INDICATIONS:  Shortness of breath, ascites, chronic edema.



HISTORY OF PRESENT ILLNESS:  This is a 58-year-old man, who is well known

to our practice, admitted with increasing shortness of breath, increasing

abdominal girth, increasing edema, weight gain.  He came to the emergency

room yesterday and was admitted.  He has a long history of alcoholic liver

disease, abdominal ascites, chronic lymphedema.  He has been somewhat

sporadic with his medications.  He continues drink alcohol.



At this time, he is resting in bed.  He is not short of breath at rest. 

There is no chest pain, syncope, palpitation, fever, chills, hemoptysis,

abdominal pain, nausea, vomiting, diarrhea, constipation, melena.



PAST MEDICAL HISTORY:  His past medical history is notable for morbid

obesity, hypertension, chronic atrial fibrillation, on warfarin.  He has a

history of a remote AFib ablation, he has chronic lymphedema, alcoholic

liver disease, chronic ascites with several admissions with paracentesis

done removing large volumes of ascitic fluid.  He has a history of GERD,

pulmonary hypertension, remote gastric bypass operation, remote IVC filter.



MEDICATIONS AT THE TIME OF ADMISSION:  Include warfarin, digoxin, Lasix,

metoprolol, tramadol and folic acid.  There were no medication allergies

reported.



SOCIAL HISTORY:  He lives at home.  He continues to drink wine.  He denies 
smoking currently.

He is not very ambulatory.



FAMILY HISTORY:  Noncontributory.



REVIEW OF SYSTEMS:  Ten-point review of systems is otherwise unremarkable

except as noted above.



PHYSICAL EXAMINATION:

GENERAL:  He is a well-developed, obese male with severe chronic

lymphedema, seen on telemetry.

VITAL SIGNS:  Notable for atrial fibrillation at 64 beats per minute.  He

is afebrile, blood pressure 126/69, respirations 17-20, O2 sat 98-99% on

room air.

HEENT:  Reveals no neck vein distention, thyromegaly, carotid bruit. 

Mucous membranes moist.  Conjunctivae pink.

NECK:  Supple.

LUNGS:  Lung fields clear.

HEART:  Examination of the heart revealed an irregular rhythm.  Normal

first and second heart sounds.  Soft systolic murmur along the left sternal

border.  Distant heart sounds.

ABDOMEN:  Obese with ascites, nontender.  Bowel sounds present.

EXTREMITY:  Revealed severe chronic lymphedema, chronic skin changes.  No

obvious cellulitis.

NEUROLOGICAL:  Awake, alert, oriented.

PSYCHIATRIC:  Normal as to mood and affect.

SKIN:  Warm and dry.  Severe changes of lower extremities noted.



LABORATORY DATA AND IMAGING:  EKG demonstrates atrial fibrillation, 56

beats per minute, poor R-wave progression.  Nonspecific ST wave changes. 

No change from the prior EKG.  A portable chest x-ray reveals no active

disease.  White count normal.  Hemoglobin 10.1, hematocrit 31.3, platelet

count normal.  PT 24.9, INR 2.13, PTT 33.8.  Electrolytes notable for a

sodium of 131, chloride 93, potassium 4.3, carbon dioxide 28, BUN 13,

creatinine 0.8.  Bilirubin 2.6, repeat 3.1.  Other LFTs unremarkable.  BNP

16,000.  Dig level 1.3.



IMPRESSION:  Dayne Nava is a 58-year-old man, readmitted with chronic

lymphedema, ascites, shortness of breath in the setting of alcoholic liver

disease, pulmonary hypertension, continued drinking alcohol and medication 
noncompliance.



He is admitted to telemetry.  He will have a GI evaluation.  A paracentesis

will be arranged.  He will be on IV Lasix.  I will continue his digoxin,

metoprolol.  He is getting tramadol, Pepcid, morphine, Benadryl,

spironolactone.  Warfarin is on hold pending paracentesis.  Monitor INRs. 

Bridge with Lovenox or heparin when the INR is subtherapeutic.  Monitor

inputs and outputs, stool for occult blood, daily weights, daily labs with

INRs.  We will review his old records.  We will follow along with you and

make additional recommendations based on his clinical course.  He has been

advised  to discontinue all alcohol consumption and to

take all medications as prescribed upon discharge.





__________________________________________

Moshe Mercedes MD





DD:  07/26/2018 9:14:36

DT:  07/26/2018 9:24:56

Job # [id number]

ROW[place]

## 2018-07-26 NOTE — CP.PCM.PN
<Liang Hair - Last Filed: 07/26/18 15:04>





Subjective





- Date & Time of Evaluation


Date of Evaluation: 07/26/18


Time of Evaluation: 08:30





- Subjective


Subjective: 


Liang Hair DO PGY-1, Family Medicine Resident


Medicine Progress Note





Pt seen and examined at bedside. States that he was only able to sleep for 

about 30 minutes overnight, c/o shortness of breath and dyspnea with exertion. 

Improves with positional changes. C/o worsening ascites. No acute events 

reported overnight. 





Objective





- Vital Signs/Intake and Output


Vital Signs (last 24 hours): 


 











Temp Pulse Resp BP Pulse Ox


 


 98 F   53 L  18   143/84   98 


 


 07/26/18 12:00  07/26/18 12:00  07/26/18 12:00  07/26/18 12:00  07/26/18 05:56








Intake and Output: 


 











 07/26/18 07/26/18





 06:59 18:59


 


Intake Total 360 


 


Balance 360 














- Medications


Medications: 


 Current Medications





Digoxin (Lanoxin)  0.25 mg PO DAILY [facility]


   Last Admin: 07/26/18 09:36 Dose:  0.25 mg


Diphenhydramine HCl (Benadryl)  25 mg PO HS PRN


   PRN Reason: Insomnia


   Last Admin: 07/25/18 22:23 Dose:  25 mg


Famotidine (Pepcid)  20 mg PO HS [facility]


   Last Admin: 07/25/18 21:36 Dose:  20 mg


Furosemide (Lasix)  40 mg IVP BID [facility]


   Last Admin: 07/26/18 09:25 Dose:  Not Given


Metoprolol Tartrate (Lopressor)  50 mg PO BID [facility]


   Last Admin: 07/26/18 09:36 Dose:  50 mg


Spironolactone (Aldactone)  100 mg PO DAILY [facility]


   Last Admin: 07/26/18 09:36 Dose:  100 mg


Tramadol HCl (Ultram)  50 mg PO TID PRN


   PRN Reason: Pain, severe (8-10)


   Last Admin: 07/26/18 10:17 Dose:  50 mg











- Labs


Labs: 


 





 07/26/18 07:30 





 07/26/18 07:30 





 











PT  24.9 SECONDS (9.4-12.5)  H  07/26/18  07:30    


 


INR  2.13  (0.93-1.08)  H  07/26/18  07:30    


 


APTT  33.8 Seconds (25.1-36.5)   07/25/18  15:40    














- Constitutional


Appears: No Acute Distress, Older Than Stated Age, Chronically Ill





- Head Exam


Head Exam: ATRAUMATIC, NORMAL INSPECTION, NORMOCEPHALIC





- Eye Exam


Eye Exam: EOMI, Normal appearance, PERRL





- ENT Exam


ENT Exam: Mucous Membranes Moist, Normal Oropharynx





- Neck Exam


Neck Exam: Full ROM, Normal Inspection





- Respiratory Exam


Respiratory Exam: Clear to Ausculation Bilateral, NORMAL BREATHING PATTERN





- Cardiovascular Exam


Cardiovascular Exam: REGULAR RHYTHM, +S1, +S2





- GI/Abdominal Exam


GI & Abdominal Exam: Distended, Normal Bowel Sounds


Additional comments: 


Fluid wave palpated in abdomen, no tenderness to palpation in all 4 quadrants, 

hepatomegaly, neg Bernard's sign, no rebound tenderness; 5 cm ventral hernia, 

incisional found on exam, not incarcerated or tender to palpation





- Extremities Exam


Extremities Exam: Full ROM


Additional comments: 


Signs of venous stasis appreciated, 2+ pitting edema in lower exts b/l, pedal 

edema b/l





- Neurological Exam


Neurological Exam: Alert, Awake, CN II-XII Intact, Oriented x3





- Psychiatric Exam


Psychiatric exam: Normal Affect, Normal Mood





- Skin


Skin Exam: Dry, Intact, Warm





Assessment and Plan





- Assessment and Plan (Free Text)


Assessment: 


58 year old male with past medical history of ascites, CHF, atrial fibrillation

, morbid obesity, lymphedema, GERD, Pulmonary HTN who presented with shortness 

of breath and worsening ascites. 


Plan: 


CHF with ascites


BNP on admission 1600


Lasix 20 mg PO daily


Aldactone 100 mg PO daily


Digoxin 0.25 mg PO daily; digoxin level wnl 


Strict I's/O's


Daily weights


Heart healthy diet


Na 2 gram, 1200 fluid restriction


EKG in ED demonstrated atrial fibrillation, slow ventricular response; HR 56; 

possible anterior infarct age undetermined


CXR in ED negative for active pulmonary disease


Echo May 2018: moderate to severe tricuspid regurgitation, severe pulmonary HTN


Cardio consulted, recs appreciated


GI consulted (Dr. Reyes), recs appreciated


U/s abdomen done, f/u results


Plan for pt to have bedside paracentesis today w/ cell count, albumin, and 

protein level


Per GI: if > 5 L removed, pt to receive albumin 25% 6-8 g/L


PT eval pending





Hx Atrial fibrillation


Continue Metoprolol Succinate 100 mg PO daily 


Warfarin to be held until afer paracentesis; INR today 2.13


Digoxin 0.25 mg PO daily





Hx chronic pain


C/w home med tramadol 50 mg PO tid prn





Ventral incisional hernia


Pt states that he had gastric bypass surgery in 2000


Area of hernia not tender to palpation, no incarceration of bowel noted on 

physical exam


Pt instructed to f/u with gen surgery outpatient for possible surgical repair





DVT/GI prophylaxis: SCDs/Pepcid





Pt seen, examined with, and plan discussed with Dr. Winn, attending.





Liang Hair DO PGY-1, Family Medicine Resident


Pager #[phone]








<Elvira Winn - Last Filed: 07/26/18 18:19>





Objective





- Vital Signs/Intake and Output


Vital Signs (last 24 hours): 


 











Temp Pulse Resp BP Pulse Ox


 


 98.3 F   90   20   148/82   98 


 


 07/26/18 17:55  07/26/18 17:55  07/26/18 17:55  07/26/18 17:55  07/26/18 05:56








Intake and Output: 


 











 07/26/18 07/26/18





 06:59 18:59


 


Intake Total 360 


 


Balance 360 














- Medications


Medications: 


 Current Medications





Digoxin (Lanoxin)  0.25 mg PO DAILY [facility]


   Last Admin: 07/26/18 09:36 Dose:  0.25 mg


Diphenhydramine HCl (Benadryl)  25 mg PO HS PRN


   PRN Reason: Insomnia


   Last Admin: 07/25/18 22:23 Dose:  25 mg


Famotidine (Pepcid)  20 mg PO HS [facility]


   Last Admin: 07/25/18 21:36 Dose:  20 mg


Furosemide (Lasix)  40 mg IVP BID [facility]


   Last Admin: 07/26/18 17:34 Dose:  40 mg


Metoprolol Tartrate (Lopressor)  50 mg PO BID [facility]


   Last Admin: 07/26/18 17:34 Dose:  50 mg


Spironolactone (Aldactone)  100 mg PO DAILY [facility]


   Last Admin: 07/26/18 09:36 Dose:  100 mg


Tramadol HCl (Ultram)  50 mg PO TID PRN


   PRN Reason: Pain, severe (8-10)


   Last Admin: 07/26/18 10:17 Dose:  50 mg











- Labs


Labs: 


 





 07/26/18 07:30 





 07/26/18 07:30 





 











PT  24.9 SECONDS (9.4-12.5)  H  07/26/18  07:30    


 


INR  2.13  (0.93-1.08)  H  07/26/18  07:30    


 


APTT  33.8 Seconds (25.1-36.5)   07/25/18  15:40    














Attending/Attestation





- Attestation


I have personally seen and examined this patient.: Yes


I have fully participated in the care of the patient.: Yes


I have reviewed all pertinent clinical information, including history, physical 

exam and plan: Yes


Notes (Text): 





07/26/18 18:12


Medical record note made by the resident after discussion with my direction and 

input after the patient was personally seen and examined by me. I have reviewed 

the chart and agree that the record accurately reflects by personal performance 

of the history, physical exam, data review, and medical decision-making, in the 

course for the patient. I have also personally directed the plan of care





58 year old male with past medical history of Morbid Obesity,HTN, diastolic  CHF

, atrial fibrillation on anticoagulation with warfarin, , lymphadema, GERD, and 

Pulmonary HTN was admitted with dyspnea on exertion and worsening abdominal 

swelling.Patient dyspnea was due to fluid overload,and worsening ascites. He 

was treated with  IV Lasix and aldactone.Patient underwent Paracentesis today, 

the fluid is blood tinged.We will get cell count, gram stain, cultures, cytology

, protein,L.DH and glucose





Management plan was discussed in detail with patient. Education was provided.

## 2018-07-27 VITALS — HEART RATE: 64 BPM

## 2018-07-27 VITALS — OXYGEN SATURATION: 97 %

## 2018-07-27 VITALS — RESPIRATION RATE: 18 BRPM

## 2018-07-27 VITALS — DIASTOLIC BLOOD PRESSURE: 61 MMHG | SYSTOLIC BLOOD PRESSURE: 126 MMHG

## 2018-07-27 VITALS — TEMPERATURE: 98 F

## 2018-07-27 VITALS — HEART RATE: 56 BPM

## 2018-07-27 LAB
ALBUMIN SERPL-MCNC: 4 G/DL (ref 3–4.8)
ALBUMIN/GLOB SERPL: 1.4 {RATIO} (ref 1.1–1.8)
ALT SERPL-CCNC: 28 U/L (ref 7–56)
AST SERPL-CCNC: 24 U/L (ref 17–59)
BASOPHILS # BLD AUTO: 0.11 K/MM3 (ref 0–2)
BASOPHILS NFR BLD: 1.4 % (ref 0–3)
BUN SERPL-MCNC: 13 MG/DL (ref 7–21)
CALCIUM SERPL-MCNC: 9.2 MG/DL (ref 8.4–10.5)
EOSINOPHIL # BLD: 0.3 10*3/UL (ref 0–0.7)
EOSINOPHIL NFR BLD: 4.2 % (ref 1.5–5)
ERYTHROCYTE [DISTWIDTH] IN BLOOD BY AUTOMATED COUNT: 15.4 % (ref 11.5–14.5)
GFR NON-AFRICAN AMERICAN: > 60
GRANULOCYTES # BLD: 5.21 10*3/UL (ref 1.4–6.5)
GRANULOCYTES NFR BLD: 65.9 % (ref 50–68)
HGB BLD-MCNC: 10.7 G/DL (ref 14–18)
INR PPP: 2.04 (ref 0.93–1.08)
LYMPHOCYTES # BLD: 1.3 10*3/UL (ref 1.2–3.4)
LYMPHOCYTES NFR BLD AUTO: 16.4 % (ref 22–35)
MCH RBC QN AUTO: 28.5 PG (ref 25–35)
MCHC RBC AUTO-ENTMCNC: 32 G/DL (ref 31–37)
MCV RBC AUTO: 89.1 FL (ref 80–105)
MONOCYTES # BLD AUTO: 1 10*3/UL (ref 0.1–0.6)
MONOCYTES NFR BLD: 12.1 % (ref 1–6)
PLATELET # BLD: 244 10^3/UL (ref 120–450)
PMV BLD AUTO: 8.5 FL (ref 7–11)
PROTHROMBIN TIME: 23.8 SECONDS (ref 9.4–12.5)
RBC # BLD AUTO: 3.75 10^6/UL (ref 3.5–6.1)
WBC # BLD AUTO: 7.9 10^3/UL (ref 4.5–11)

## 2018-07-27 RX ADMIN — DIGOXIN SCH MG: 0.25 TABLET ORAL at 09:33

## 2018-07-27 NOTE — CP.PCM.PN
<Adis Patten - Last Filed: 07/27/18 11:22>





Subjective





- Date & Time of Evaluation


Date of Evaluation: 07/27/18


Time of Evaluation: 07:00





- Subjective


Subjective: 





PGY-4 GI Fellow Prog Note





Pt sitting up in bed when seen this AM.  State abd distension and breathing 

improved after para and diuresis yesterday.  Denied f/c, melena, hematochezia.





5 point ROS negative other than stated above





Objective





- Vital Signs/Intake and Output


Vital Signs (last 24 hours): 


 











Temp Pulse Resp BP Pulse Ox


 


 98 F   68   20   158/88 H  95 


 


 07/27/18 06:00  07/27/18 06:00  07/27/18 06:00  07/27/18 06:00  07/27/18 06:00








Intake and Output: 


 











 07/26/18 07/27/18





 18:59 06:59


 


Intake Total 360 


 


Output Total 2250 


 


Balance -1890 














- Medications


Medications: 


 Current Medications





Digoxin (Lanoxin)  0.25 mg PO DAILY [facility]


   Last Admin: 07/26/18 09:36 Dose:  0.25 mg


Diphenhydramine HCl (Benadryl)  25 mg PO HS PRN


   PRN Reason: Insomnia


   Last Admin: 07/25/18 22:23 Dose:  25 mg


Famotidine (Pepcid)  20 mg PO HS [facility]


   Last Admin: 07/26/18 22:22 Dose:  20 mg


Furosemide (Lasix)  40 mg IVP BID [facility]


   Last Admin: 07/26/18 17:34 Dose:  40 mg


Metoprolol Tartrate (Lopressor)  50 mg PO BID [facility]


   Last Admin: 07/26/18 17:34 Dose:  50 mg


Spironolactone (Aldactone)  100 mg PO DAILY [facility]


   Last Admin: 07/26/18 09:36 Dose:  100 mg


Tramadol HCl (Ultram)  50 mg PO TID PRN


   PRN Reason: Pain, severe (8-10)


   Last Admin: 07/26/18 10:17 Dose:  50 mg











- Labs


Labs: 


 





 07/26/18 07:30 





 07/26/18 07:30 





 











PT  24.9 SECONDS (9.4-12.5)  H  07/26/18  07:30    


 


INR  2.13  (0.93-1.08)  H  07/26/18  07:30    


 


APTT  33.8 Seconds (25.1-36.5)   07/25/18  15:40    














- Constitutional


Appears: Older Than Stated Age, Chronically Ill





- Eye Exam


Eye Exam: EOMI.  absent: Conjunctival injection, Scleral icterus





- Cardiovascular Exam


Cardiovascular Exam: REGULAR RHYTHM (split S2).  absent: Bradycardia, 

Tachycardia





- GI/Abdominal Exam


GI & Abdominal Exam: Distended, Firm, Soft, Normal Bowel Sounds (massively 

obese limiting exam, flank fullness).  absent: Bruit, Guarding, Rigid, 

Tenderness





- Neurological Exam


Neurological Exam: Awake, Oriented x3





- Skin


Additional comments: 





Bilateral LE chronic venous+lymphedema bilaterally





Assessment and Plan





- Assessment and Plan (Free Text)


Assessment: 





57 yo WM with h/o Cirrhosis (EtOH +/- Cardiac), HFpEF with Pulmonary HTN, 

Morbid Obesity presenting with worsening shortness of breath and increased 

abdominal distention.





Plan: 





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 

Abuse, inadequate diuretic regimen, dietary noncompliance.  Viral hep ruled out 

in 2016.  MELD-Na 23 though INR elevated due to warfarin use.


--- Paracentesis with 1 L serosang fluid removed. 967 WBC, 14% neut.  318k RBC.

  Unclear why bloody multiple times?  Plan for triple phase CT Scan as OP to 

eval for HCC and assess abd for possible cause of bloody taps. Check cytology 

with next tap, previously unremarkable.


--- RUQ Doppler to check for PVT pending


--- Furosemide 20 mg Daily, Spironolactone 100 mg daily


- HCC: Needs triple phase CT or MRI to eval, can be done as OP. Check AFP.


- Varices: Needs OP EGD


- HE: None apparent at this time


- Vaccines: Needs Hep A and B vaccination if not done


# Hyponatremia, Hypervolemic: Likely due to above and CHF.  Given cardiac 

disease with hyponatremia, recommend Cardiology consult as well to assist with 

diuresis.





Patient seen and examined with Dr. Gold





Will sign off, patient can f/u with Dr. Reyes in outpatient clinic





<Daniel Gold - Last Filed: 07/27/18 12:40>





Objective





- Vital Signs/Intake and Output


Vital Signs (last 24 hours): 


 











Temp Pulse Resp BP Pulse Ox


 


 98 F   59 L  18   140/88   95 


 


 07/27/18 12:00  07/27/18 12:00  07/27/18 12:00  07/27/18 12:00  07/27/18 06:00








Intake and Output: 


 











 07/27/18 07/27/18





 06:59 18:59


 


Intake Total 700 


 


Output Total 2900 


 


Balance -2200 














- Medications


Medications: 


 Current Medications





Digoxin (Lanoxin)  0.25 mg PO DAILY [facility]


   Last Admin: 07/27/18 09:33 Dose:  0.25 mg


Diphenhydramine HCl (Benadryl)  25 mg PO HS PRN


   PRN Reason: Insomnia


   Last Admin: 07/25/18 22:23 Dose:  25 mg


Famotidine (Pepcid)  20 mg PO HS [facility]


   Last Admin: 07/26/18 22:22 Dose:  20 mg


Furosemide (Lasix)  40 mg IVP BID [facility]


   Last Admin: 07/27/18 09:36 Dose:  40 mg


Furosemide (Lasix)  40 mg IVP ONCE ONE


   Stop: 07/27/18 14:01


Metoprolol Tartrate (Lopressor)  50 mg PO BID [facility]


   Last Admin: 07/27/18 09:33 Dose:  50 mg


Spironolactone (Aldactone)  100 mg PO DAILY [facility]


   Last Admin: 07/27/18 09:32 Dose:  100 mg


Tramadol HCl (Ultram)  50 mg PO TID PRN


   PRN Reason: Pain, severe (8-10)


   Last Admin: 07/26/18 10:17 Dose:  50 mg











- Labs


Labs: 


 





 07/27/18 07:15 





 07/27/18 07:15 





 











PT  23.8 SECONDS (9.4-12.5)  H  07/27/18  07:15    


 


INR  2.04  (0.93-1.08)  H  07/27/18  07:15    


 


APTT  33.8 Seconds (25.1-36.5)   07/25/18  15:40    














Attending/Attestation





- Attestation


I have personally seen and examined this patient.: Yes


I have fully participated in the care of the patient.: Yes


I have reviewed all pertinent clinical information, including history, physical 

exam and plan: Yes


Notes (Text): 





07/27/18 12:26


I have seen and examined patient with GI fellow.  Patient ambulating in hallway

, appears quite comfortable.  He denies abdominal pain, nausea, vomiting, fever/

chills, tolerating PO diet without difficulty.  s/p paracentesis yesterday with 

1 L fluid removed.  Review of vitals from today shows elevated BP.





ETOH/cardiac decompensated cirrhosis


Morbid obesity (BMI 57)


Pulmonary HTN


Progressive abdominal distention, pain - ascites s/p paracentesis





- Low sodium diet as tolerated


- Continue with diuretic therapy, monitor electrolytes


- Follow up abdominal US results


- Obtain AFP


- Patient would benefit from dedicated triple phase liver imaging for HCC 

evaluation


- Currently not a candidate for transplant evaluation given ongoing ETOH abuse 

and medical comorbidities


- From GI perspective ok to discharge home with subsequent outpatient follow 

up.  Will sign off case, please reconsult as necessary, thank you.

## 2018-07-27 NOTE — CP.PCM.PN
Subjective





- Date & Time of Evaluation


Date of Evaluation: 07/27/18


Time of Evaluation: 07:00





- Subjective


Subjective: 





Stable on 2R. He feels better s/p paracentesis 1 lt and diureses 5 lt. Less 

SOB. No CP.





V/S noted. AF.  Several 2 - 2.5 sec pauses noted.





PE:





Lungs: decreased BS at bases


Cor: irreg S1S2


Abd: obese, + ascites


Ext: Severe chronic edema and skin changes


Neuro.; alert








I/O = 1060/5150





Labs noted; INR = 2.04,  BMP OK, Dig = 1.3





Stool for OB Neg











Objective





- Vital Signs/Intake and Output


Vital Signs (last 24 hours): 


 











Temp Pulse Resp BP Pulse Ox


 


 98 F   68   20   158/88 H  95 


 


 07/27/18 06:00  07/27/18 06:00  07/27/18 06:00  07/27/18 06:00  07/27/18 06:00








Intake and Output: 


 











 07/27/18 07/27/18





 06:59 18:59


 


Intake Total 700 


 


Output Total 2900 


 


Balance -2200 














- Medications


Medications: 


 Current Medications





Digoxin (Lanoxin)  0.25 mg PO DAILY [facility]


   Last Admin: 07/26/18 09:36 Dose:  0.25 mg


Diphenhydramine HCl (Benadryl)  25 mg PO HS PRN


   PRN Reason: Insomnia


   Last Admin: 07/25/18 22:23 Dose:  25 mg


Famotidine (Pepcid)  20 mg PO HS [facility]


   Last Admin: 07/26/18 22:22 Dose:  20 mg


Furosemide (Lasix)  40 mg IVP BID [facility]


   Last Admin: 07/26/18 17:34 Dose:  40 mg


Metoprolol Tartrate (Lopressor)  50 mg PO BID [facility]


   Last Admin: 07/26/18 17:34 Dose:  50 mg


Spironolactone (Aldactone)  100 mg PO DAILY [facility]


   Last Admin: 07/26/18 09:36 Dose:  100 mg


Tramadol HCl (Ultram)  50 mg PO TID PRN


   PRN Reason: Pain, severe (8-10)


   Last Admin: 07/26/18 10:17 Dose:  50 mg











- Labs


Labs: 


 





 07/27/18 07:15 





 07/27/18 07:15 





 











PT  23.8 SECONDS (9.4-12.5)  H  07/27/18  07:15    


 


INR  2.04  (0.93-1.08)  H  07/27/18  07:15    


 


APTT  33.8 Seconds (25.1-36.5)   07/25/18  15:40    














Assessment and Plan





- Assessment and Plan (Free Text)


Assessment: 





SOB/Orthopnea/Insomnia


Increased edema and weight


Morbid Obesity


Alcoholic Liver Disease with acites, hyperbilirubinemia


HBP


AF s/p remote AF ablation


PH


GERD


Remote Gastric Bypas


+ ETOH


Remote Tobacco


IVC Filter








Plan:





Pt anxious to go home but I advised him to stay to allow additional fluid/

volume removal.


Continue IV Lasix and PO spironolactone


Additional paracentesis, as per GI


Monitor labs, I/O, Renal Fx., INRs, sats, etc.


D/C all ETOH. He now says he will abstain.


Medication compliance discussed with him also.

## 2018-07-27 NOTE — CP.PCM.DIS
Provider





- Provider


Date of Admission: 


07/25/18 17:12





Attending physician: 


Elvira Winn MD





Primary care physician: 


Nawaf Hampton MD





Time Spent in preparation of Discharge (in minutes): 45





Hospital Course





- Lab Results


Lab Results: 


 Micro Results





07/26/18 15:31   Other: Please Indicate   Gram Stain - Final


07/26/18 15:31   Other: Please Indicate   Tissue Culture - Preliminary


                            NO GROWTH AFTER 24 HOURS





 Most Recent Lab Values











WBC  7.9 10^3/ul (4.5-11.0)   07/27/18  07:15    


 


RBC  3.75 10^6/uL (3.5-6.1)   07/27/18  07:15    


 


Hgb  10.7 g/dL (14.0-18.0)  L  07/27/18  07:15    


 


Hct  33.4 % (42.0-52.0)  L  07/27/18  07:15    


 


MCV  89.1 fl (80.0-105.0)   07/27/18  07:15    


 


MCH  28.5 pg (25.0-35.0)   07/27/18  07:15    


 


MCHC  32.0 g/dl (31.0-37.0)   07/27/18  07:15    


 


RDW  15.4 % (11.5-14.5)  H  07/27/18  07:15    


 


Plt Count  244 10^3/uL (120.0-450.0)   07/27/18  07:15    


 


MPV  8.5 fl (7.0-11.0)   07/27/18  07:15    


 


Gran %  65.9 % (50.0-68.0)   07/27/18  07:15    


 


Lymph % (Auto)  16.4 % (22.0-35.0)  L  07/27/18  07:15    


 


Mono % (Auto)  12.1 % (1.0-6.0)  H  07/27/18  07:15    


 


Eos % (Auto)  4.2 % (1.5-5.0)   07/27/18  07:15    


 


Baso % (Auto)  1.4 % (0.0-3.0)   07/27/18  07:15    


 


Gran #  5.21  (1.4-6.5)   07/27/18  07:15    


 


Lymph # (Auto)  1.3  (1.2-3.4)   07/27/18  07:15    


 


Mono # (Auto)  1.0  (0.1-0.6)  H  07/27/18  07:15    


 


Eos # (Auto)  0.3  (0.0-0.7)   07/27/18  07:15    


 


Baso # (Auto)  0.11 K/mm3 (0.0-2.0)   07/27/18  07:15    


 


PT  23.8 SECONDS (9.4-12.5)  H  07/27/18  07:15    


 


INR  2.04  (0.93-1.08)  H  07/27/18  07:15    


 


APTT  33.8 Seconds (25.1-36.5)   07/25/18  15:40    


 


Sodium  132 mmol/L (132-148)   07/27/18  07:15    


 


Potassium  4.4 mmol/L (3.6-5.0)   07/27/18  07:15    


 


Chloride  91 mmol/L ()  L  07/27/18  07:15    


 


Carbon Dioxide  29 mmol/L (21-33)   07/27/18  07:15    


 


Anion Gap  16  (10-20)   07/27/18  07:15    


 


BUN  13 mg/dL (7-21)   07/27/18  07:15    


 


Creatinine  0.8 mg/dl (0.8-1.5)   07/27/18  07:15    


 


Est GFR ( Amer)  > 60   07/27/18  07:15    


 


Est GFR (Non-Af Amer)  > 60   07/27/18  07:15    


 


Random Glucose  86 mg/dL ()   07/27/18  07:15    


 


Calcium  9.2 mg/dL (8.4-10.5)   07/27/18  07:15    


 


Total Bilirubin  3.7 mg/dL (0.2-1.3)  H  07/27/18  07:15    


 


AST  24 U/L (17-59)   07/27/18  07:15    


 


ALT  28 U/L (7-56)   07/27/18  07:15    


 


Alkaline Phosphatase  92 U/L ()   07/27/18  07:15    


 


NT-Pro-B Natriuret Pep  1600 pg/mL (0-450)  H  07/25/18  16:08    


 


Total Protein  6.9 g/dL (5.8-8.3)   07/27/18  07:15    


 


Albumin  4.0 g/dL (3.0-4.8)   07/27/18  07:15    


 


Globulin  2.9 gm/dL  07/27/18  07:15    


 


Albumin/Globulin Ratio  1.4  (1.1-1.8)   07/27/18  07:15    


 


Fluid Source  Peritoneal   07/26/18  17:34    


 


Fluid Appearance  Bloody  (CLEAR)   07/26/18  17:34    


 


Fluid WBC  967.0 /uL (0.0-300.0)  H  07/26/18  17:34    


 


Fluid RBC  205678.0 /uL (0.0-0.0)  H  07/26/18  17:34    


 


Fluid Tot Cell Count  100  (0-0)  H  07/26/18  17:34    


 


Fluid Neutrophils  14.2 % (0-0)  H  07/26/18  17:34    


 


Fluid Lymphocytes  85.8 % (0-0)  H  07/26/18  17:34    


 


Fld Monocyte/Macrophag  TEST NOT PERFORMED   07/26/18  17:34    


 


Fluid Comment  Red/cloudy   07/26/18  17:34    


 


Stool Occult Blood  Negative  (NEGATIVE)   07/26/18  17:40    


 


Digoxin  1.3 ng/mL (0.8-2.0)   07/25/18  16:08    














Discharge Exam





- Head Exam


Head Exam: ATRAUMATIC, NORMAL INSPECTION, NORMOCEPHALIC





Discharge Plan





- Discharge Medications


Prescriptions: 


Furosemide [Lasix] 40 mg PO BID #30 vial


Spironolactone [Aldactone] 100 mg PO DAILY #30 tab





- Follow Up Plan


Condition: GUARDED


Disposition: HOME/ ROUTINE


Instructions:  Fluid in the Belly (Ascites) (DC)


Additional Instructions: 


1.Please follow up with your PMD, Dr. Hampton, within 3-5 days regarding 

this admission.


2.Please remember to maintain a diet low in sodium, continue with fluid 

restriction as well as this may leads to worsening of ascites.


3.Continue to measure daily weights, and please refrain from alcohol use


4.You will be sent home with Spironolactone 100 mg per day as well as 

Furosemide 40 mg per day. Please take as prescribed .


5.If symptoms worsen or return please go to your nearest emergency department.  


Referrals: 


Nawaf Hampton MD [Primary Care Provider] -

## 2018-07-29 LAB
CHOLESTEROL [MASS/VOLUME] IN PERITONEAL FLUID: 74 MG/DL (ref ?–48)
GLUCOSE PRT-MCNC: 85 MG/DL
PROT PRT-MCNC: 4.3 G/DL

## 2018-07-29 NOTE — US
PROCEDURE:  Portal vein duplex ultrasound.



CLINICAL HISTORY:  Cirrhosis. Deteriorating liver function. Evaluate 

for portal vein thrombosis.



PHYSICIAN(S):  Boston Paniagua M.D.



FINDINGS:

The exam is very limited. 



The extrahepatic portal vein is patent with hepatopetal flow. No 

sonographic evidence for thrombus or obstruction is seen. The 3 

hepatic veins are visualized centrally and patent. The hepatic artery 

is patent.



Limited images of the hepatic parenchyma are unremarkable. 



The spleen is normal in size. 



No ascites is appreciated. 



IMPRESSION:

1. Patent portal vein with hepatopetal flow.

## 2021-04-12 NOTE — ED PDOC
Arrival/HPI





- General


Time Seen by Provider: 07/25/18 14:55


Historian: Patient





- History of Present Illness


Narrative History of Present Illness (Text): 





07/25/18 15:23


Dayne Nava is a morbidly obese 58 year old male, whose past medical 

history includes ascites, atrial fibrillation, morbid obesity, lymphedema, GERD

, pulmonary Hypertension, who presents to the emergency room complaining of 

shortness of breath since today. Patient reports baseline weight of 330 pounds, 

but currently weighs 390+ pounds. Patient notes symptoms are worsened when 

lying flat and improved when sitting up. As per patient symptoms are similar to 

last visit, which required diuresis and paracentesis with resolved symptoms. 

Patient denies fever, chest pain, back pain, neck pain, cough, nausea, vomiting

, diarrhea, abdominal pain, dysuria, or any other complaints. 





PMD:Dr. Naik


CARDIOLOGIST: Dr. Orr


Time/Duration: Prior to Arrival


Symptom Onset: Gradual


Symptom Course: Unchanged


Activities at Onset: Light





Past Medical History





- Provider Review


Nursing Documentation Reviewed: Yes





- Infectious Disease


Hx of Infectious Diseases: None





- Tetanus Immunization


Tetanus Immunization: Unknown





- Cardiac


Hx Cardiac Disorders: Yes


Hx Hypertension: Yes





- Pulmonary


Hx Respiratory Disorders: No





- Neurological


Hx Neurological Disorder: No





- HEENT


Hx HEENT Disorder: No





- Renal


Hx Renal Disorder: No





- Endocrine/Metabolic


Hx Endocrine Disorders: No





- Hematological/Oncological


Hx Blood Disorders: No





- Integumentary


Hx Dermatological Disorder: No





- Musculoskeletal/Rheumatological


Hx Musculoskeletal Disorders: Yes


Hx Falls: No


Hx Herniated Disk: Yes





- Gastrointestinal


Hx Gastrointestinal Disorders: Yes





- Genitourinary/Gynecological


Hx Genitourinary Disorders: No





- Psychiatric


Hx Psychophysiologic Disorder: No


Hx Substance Use: No





- Surgical History


Hx Gastric Bypass Surgery: Yes (2000)


Other/Comment: Cardiac ablation for atrial fibrillation





- Anesthesia


Hx Anesthesia Reactions: No


Hx Malignant Hyperthermia: No





- Suicidal Assessment


Feels Threatened In Home Enviroment: No





Family/Social History





- Physician Review


Nursing Documentation Reviewed: Yes


Family/Social History: Unknown Family HX


Smoking Status: Former Smoker


Hx Alcohol Use: No


Hx Substance Use: No


Hx Substance Use Treatment: No





Allergies/Home Meds


Allergies/Adverse Reactions: 


Allergies





No Known Allergies Allergy (Verified 07/25/18 16:11)


 








Home Medications: 


 Home Meds











 Medication  Instructions  Recorded  Confirmed


 


Metoprolol Tartrate 100 mg PO DAILY 04/20/18 07/25/18














Review of Systems





- Physician Review


All systems were reviewed & negative as marked: Yes





- Review of Systems


Constitutional: absent: Fevers


Respiratory: SOB


Cardiovascular: absent: Chest Pain





Physical Exam





- Physical Exam


Narrative Physical Exam (Text): 





07/25/18 15:23


Constitutional: No acute distress. Morbidly obese.


Head: Normocephalic.  Atraumatic.  


Eyes:  PERRL.


ENT:  Moist mucous membranes.


Neck:  Supple.


Cardiovascular:  Regular rate.


Chest: No tenderness.


Respiratory:  Clear to auscultation bilaterally.


GI:  Soft. Nontender. Nondistended. 


Back:  No CVA tenderness.


Musculoskeletal:  No tenderness or swelling of extremities.


Lower Extremities: Bilateral lymphedema. Chronic skin changes.


Skin:  No rash. 


Neurologic:  Alert, no focal deficit. 


Vital Signs Reviewed: Yes


Vital Signs











  Temp Pulse Resp BP Pulse Ox


 


 07/25/18 16:40   73  18  143/65  98


 


 07/25/18 15:05  98.2 F  70  18  142/63  99











Temperature: Afebrile


Blood Pressure: Normal


Pulse: Regular


Respiratory Rate: Normal


Appearance: Positive for: Other (morbidly obese)


Pain Distress: None


Mental Status: Positive for: Alert and Oriented X 3





Medical Decision Making


ED Course and Treatment: 





07/25/18 15:23


Impression: Dayne Nava is a 58 year old male who presents to the 

emergency room for shortness of breath. 





Plan:


-- Labs


-- Chest X-ray


-- Reassess and disposition





Prior Visits:


Notes and results from previous visits were reviewed. Patient presented to the 

Emergency department on 05/15/18 for shortness of breath and was admitted to 

the hospital for further observation.





Progress Notes: 


07/25/18 15:30


Chest X-Ray reviewed by radiologist, shows:


No active disease.





EKG Afib 56 bpm, no ST elevations.





Dr. Winn accepts patient to hospitalist service.





- Lab Interpretations


Lab Results: 








 07/25/18 15:40 





 07/25/18 16:08 





 Lab Results





07/25/18 16:08: Digoxin 1.3


07/25/18 16:08: Sodium 131 L, Potassium 4.9, Chloride 93 L, Carbon Dioxide 28, 

Anion Gap 16, BUN 12, Creatinine 0.7 L, Est GFR ( Amer) > 60, Est GFR (

Non-Af Amer) > 60, Random Glucose 92, Calcium 8.8, Total Bilirubin 2.6 H, AST 27

, ALT 28, Alkaline Phosphatase 95, NT-Pro-B Natriuret Pep 1600 H, Total Protein 

6.9, Albumin 3.8, Globulin 3.1, Albumin/Globulin Ratio 1.2


07/25/18 15:40: PT 23.2 H, INR 2.01 H, APTT 33.8


07/25/18 15:40: WBC 8.9  D, RBC 3.70, Hgb 10.7 L, Hct 32.4 L, MCV 87.6  D, MCH 

28.9, MCHC 33.0, RDW 15.6 H, Plt Count 305, MPV 9.5, Gran % 66.6, Lymph % (Auto

) 18.8 L, Mono % (Auto) 8.9 H, Eos % (Auto) 4.7, Baso % (Auto) 1.0, Gran # 5.89

, Lymph # (Auto) 1.7, Mono # (Auto) 0.8 H, Eos # (Auto) 0.4, Baso # (Auto) 0.09











- RAD Interpretation


Radiology Orders: 








07/25/18 15:20


CHEST PORTABLE [RAD] Stat 














- Medication Orders


Current Medication Orders: 











Discontinued Medications





Morphine Sulfate (Morphine)  2 mg IVP STAT STA


   Stop: 07/25/18 16:41


   Last Admin: 07/25/18 16:50  Dose: 2 mg





MAR Pain Assessment


 Document     07/25/18 16:50  SF  (Rec: 07/25/18 16:50    [id number])


     Pain Reassessment


      Is this a pain reassessment?               Yes


     Sleep


      Is patient sleeping during reassessment?   No


     Presence of Pain


      Presence of Pain                           Yes


     Pain Scale Used


      Pain Scale Used                            Numeric


     Location


      Left, Right or Bilateral                   Bilateral


      Pain Location Body Site                    Back


                                                 Knee


     Description


      Description                                Constant


IVP Administration


 Document     07/25/18 16:50  SF  (Rec: 07/25/18 16:50  SF  [id number])


     Charges for Administration


      # of IVP Administrations                   1














- Scribe Statement


The provider has reviewed the documentation as recorded by the Scribe





Eliana Martinez, training with Dariana Church. 





All medical record entries made by the Scribalec were at my direction and 

personally dictated by me. I have reviewed the chart and agree that the record 

accurately reflects my personal performance of the history, physical exam, 

medical decision making, and the department course for this patient. I have 

also personally directed, reviewed, and agree with the discharge instructions 

and disposition.





Disposition/Present on Arrival





- Present on Arrival


Any Indicators Present on Arrival: No


History of DVT/PE: No


History of Uncontrolled Diabetes: No


Urinary Catheter: No


History Surgical Site Infection Following: None





- Disposition


Have Diagnosis and Disposition been Completed?: Yes


Diagnosis: 


 Shortness of breath, Ascites





Disposition: HOSPITALIZED


Disposition Time: 17:45


Patient Plan: Admission


Condition: GUARDED no

## 2023-05-08 NOTE — US
PROCEDURE:  Ultrasound guided paracentesis.



HISTORY:

Recurrent ascites with abdominal pain and shortness of breath.  Needs 

paracentesis.



PHYSICIAN(S):  Boston Paniagua MD.



TECHNIQUE:

The relative risks and indications for the procedure were explained 

to the patient and informed written consent obtained. Sonography of 

the abdomen was performed in a supine position. This revealed a 

moderate amount of non-loculated ascites, greatest in the right lower 

quadrant. A puncture site was selected and the area was prepped and 

draped in the usual sterile fashion. 1% Xylocaine was used to 

anesthetize the skin and soft tissues. A 7 [place] paracentesis 

catheter was trocared into the right lower quadrantand 3100 cc of 

serosanguineous fluid were aspirated. No labs were sent given the 

recent paracentesis. 



IMPRESSION:

Ultrasound-guided paracentesis in the right lower quadrant. 3100 cc 

of serosanguineous fluid were aspirated.  No labs were sent.
100% of the time